# Patient Record
Sex: FEMALE | Race: WHITE | NOT HISPANIC OR LATINO | Employment: UNEMPLOYED | URBAN - NONMETROPOLITAN AREA
[De-identification: names, ages, dates, MRNs, and addresses within clinical notes are randomized per-mention and may not be internally consistent; named-entity substitution may affect disease eponyms.]

---

## 2021-02-08 LAB
BACTERIA SPEC AEROBE CULT: NO GROWTH
EXTERNAL ABO GROUPING: NORMAL
EXTERNAL ANTIBODY SCREEN: NORMAL
EXTERNAL HEMATOCRIT: 36 %
EXTERNAL HEMOGLOBIN: 11.7 G/DL
EXTERNAL PLATELET COUNT: 357 K/ΜL
EXTERNAL RH FACTOR: POSITIVE

## 2021-02-22 LAB — EXTERNAL NIPT: NORMAL

## 2021-05-18 LAB
EXTERNAL HEMATOCRIT: 32 %
EXTERNAL HEMOGLOBIN: 10.8 G/DL
EXTERNAL HEPATITIS B SURFACE ANTIGEN: NEGATIVE
EXTERNAL THYROID STIMULATING HORMONE: 1.65 M[IU]/ML
GLUCOSE 1H P 100 G GLC PO SERPL-MCNC: 199 MG/DL (ref 74–180)
RUBV IGG SERPL IA-ACNC: POSITIVE

## 2021-07-09 ENCOUNTER — INITIAL PRENATAL (OUTPATIENT)
Dept: OBSTETRICS AND GYNECOLOGY | Facility: CLINIC | Age: 21
End: 2021-07-09

## 2021-07-09 VITALS
SYSTOLIC BLOOD PRESSURE: 124 MMHG | WEIGHT: 210 LBS | HEIGHT: 63 IN | BODY MASS INDEX: 37.21 KG/M2 | DIASTOLIC BLOOD PRESSURE: 82 MMHG

## 2021-07-09 DIAGNOSIS — O09.43 SUPERVISION OF HIGH-RISK PREGNANCY WITH GRAND MULTIPARITY IN THIRD TRIMESTER: ICD-10-CM

## 2021-07-09 DIAGNOSIS — O24.414 INSULIN CONTROLLED GESTATIONAL DIABETES MELLITUS (GDM) IN THIRD TRIMESTER: Primary | ICD-10-CM

## 2021-07-09 PROCEDURE — 0501F PRENATAL FLOW SHEET: CPT | Performed by: OBSTETRICS & GYNECOLOGY

## 2021-07-09 RX ORDER — INSULIN GLARGINE 100 [IU]/ML
INJECTION, SOLUTION SUBCUTANEOUS
Status: ON HOLD | COMMUNITY
Start: 2021-07-01 | End: 2021-08-04

## 2021-07-09 RX ORDER — INSULIN LISPRO 100 [IU]/ML
INJECTION, SOLUTION INTRAVENOUS; SUBCUTANEOUS
Status: ON HOLD | COMMUNITY
Start: 2021-07-01 | End: 2021-08-04

## 2021-07-09 RX ORDER — PRENATAL WITH FERROUS FUM AND FOLIC ACID 3080; 920; 120; 400; 22; 1.84; 3; 20; 10; 1; 12; 200; 27; 25; 2 [IU]/1; [IU]/1; MG/1; [IU]/1; MG/1; MG/1; MG/1; MG/1; MG/1; MG/1; UG/1; MG/1; MG/1; MG/1; MG/1
TABLET ORAL
COMMUNITY
Start: 2021-06-15 | End: 2021-09-16 | Stop reason: HOSPADM

## 2021-07-09 RX ORDER — POLYETHYLENE GLYCOL 3350 17 G/17G
POWDER, FOR SOLUTION ORAL
COMMUNITY
Start: 2021-05-20 | End: 2021-09-16 | Stop reason: HOSPADM

## 2021-07-09 RX ORDER — HYDROXYZINE HYDROCHLORIDE 10 MG/1
TABLET, FILM COATED ORAL
COMMUNITY
Start: 2021-06-15 | End: 2021-09-16 | Stop reason: HOSPADM

## 2021-07-09 RX ORDER — BUSPIRONE HYDROCHLORIDE 10 MG/1
TABLET ORAL
COMMUNITY
Start: 2021-06-15 | End: 2021-09-16 | Stop reason: HOSPADM

## 2021-07-09 RX ORDER — FERROUS SULFATE 325(65) MG
TABLET ORAL
COMMUNITY
Start: 2021-05-20 | End: 2021-09-16 | Stop reason: HOSPADM

## 2021-07-11 PROBLEM — O99.343 DEPRESSION AFFECTING PREGNANCY IN THIRD TRIMESTER, ANTEPARTUM: Status: ACTIVE | Noted: 2021-07-11

## 2021-07-11 PROBLEM — F32.A DEPRESSION AFFECTING PREGNANCY IN THIRD TRIMESTER, ANTEPARTUM: Status: ACTIVE | Noted: 2021-07-11

## 2021-07-11 PROBLEM — O09.43 SUPERVISION OF HIGH-RISK PREGNANCY WITH GRAND MULTIPARITY IN THIRD TRIMESTER: Status: ACTIVE | Noted: 2021-07-11

## 2021-07-11 PROBLEM — O24.414 INSULIN CONTROLLED GESTATIONAL DIABETES MELLITUS (GDM) IN THIRD TRIMESTER: Status: ACTIVE | Noted: 2021-07-11

## 2021-07-11 NOTE — PROGRESS NOTES
"CC: Prenatal visit    Robyn Camara is a 21 y.o.  at 31w4d.  Doing well.  Denies contractions, LOF, or VB.  Reports good FM.  Patient presents to establish prenatal care.  Had been being seen at [place].  Is overall stable today.  Patient was recently diagnosed with gestational diabetes and started on insulin this is managed by  group.  Patient states blood sugar control seems to be getting better since starting on insulin, is emailing with nurse practitioner for blood sugar control.  Past medical history denies  Past surgical history denies  Allergies: No known drug allergies  Medications: Lantus, BuSpar, hydroxyzine, MiraLAX, prenatal vitamins  OB history   Social history denies tobacco alcohol or drugs    /82   Ht 160 cm (63\")   Wt 95.3 kg (210 lb)   LMP 2020 (Exact Date)   BMI 37.20 kg/m²     Fundal Height (cm): 31 cm  Fetal Heart Rate: 140     Problems (from 21 to present)     Problem Noted Resolved    Depression affecting pregnancy in third trimester, antepartum 2021 by Maximiliano Calderon DO No          A/P: Robyn Camara is a 21 y.o.  at 31w4d.  Supervision of high-risk pregnancy secondary to gestational diabetes with insulin control.  This is being managed by MFM group.  Consult to MFM group placed for ultrasound and diabetes management.  Patient to return to see me in 3 weeks as she has an appointment next week with MFM.  Fetal kick count  labor precautions given.  Patient to continue on Lantus at this time.  - RTC in 3 weeks  - Reviewed COVID-19 visitation policy  - Reviewed COVID-19 precautions     Diagnosis Plan   1. Insulin controlled gestational diabetes mellitus (GDM) in third trimester  US Ob Follow Up Transabdominal Approach   2. Supervision of high-risk pregnancy with grand multiparity in third trimester       Maximiliano Calderon DO  2021  05:35 CDT    "

## 2021-07-15 DIAGNOSIS — O24.414 INSULIN CONTROLLED GESTATIONAL DIABETES MELLITUS (GDM) IN THIRD TRIMESTER: ICD-10-CM

## 2021-07-16 ENCOUNTER — TELEPHONE (OUTPATIENT)
Dept: OBSTETRICS AND GYNECOLOGY | Facility: CLINIC | Age: 21
End: 2021-07-16

## 2021-07-16 NOTE — TELEPHONE ENCOUNTER
----- Message from Chapis Hanks MD sent at 7/15/2021  6:23 PM CDT -----  [facility] patient.  Please let her know that the US from yesterday is normal.

## 2021-07-21 ENCOUNTER — ROUTINE PRENATAL (OUTPATIENT)
Dept: OBSTETRICS AND GYNECOLOGY | Facility: CLINIC | Age: 21
End: 2021-07-21

## 2021-07-21 VITALS — DIASTOLIC BLOOD PRESSURE: 78 MMHG | BODY MASS INDEX: 37.91 KG/M2 | WEIGHT: 214 LBS | SYSTOLIC BLOOD PRESSURE: 120 MMHG

## 2021-07-21 DIAGNOSIS — Z3A.33 33 WEEKS GESTATION OF PREGNANCY: ICD-10-CM

## 2021-07-21 DIAGNOSIS — O99.343 DEPRESSION AFFECTING PREGNANCY IN THIRD TRIMESTER, ANTEPARTUM: ICD-10-CM

## 2021-07-21 DIAGNOSIS — R12 HEARTBURN DURING PREGNANCY IN THIRD TRIMESTER: ICD-10-CM

## 2021-07-21 DIAGNOSIS — O26.893 HEARTBURN DURING PREGNANCY IN THIRD TRIMESTER: ICD-10-CM

## 2021-07-21 DIAGNOSIS — F32.A DEPRESSION AFFECTING PREGNANCY IN THIRD TRIMESTER, ANTEPARTUM: ICD-10-CM

## 2021-07-21 DIAGNOSIS — O09.93 HIGH-RISK PREGNANCY IN THIRD TRIMESTER: ICD-10-CM

## 2021-07-21 DIAGNOSIS — O24.414 INSULIN CONTROLLED GESTATIONAL DIABETES MELLITUS (GDM) IN THIRD TRIMESTER: Primary | ICD-10-CM

## 2021-07-21 PROCEDURE — 0502F SUBSEQUENT PRENATAL CARE: CPT | Performed by: NURSE PRACTITIONER

## 2021-07-21 PROCEDURE — 59025 FETAL NON-STRESS TEST: CPT | Performed by: NURSE PRACTITIONER

## 2021-07-21 RX ORDER — OMEPRAZOLE 40 MG/1
40 CAPSULE, DELAYED RELEASE ORAL DAILY
Qty: 90 CAPSULE | Refills: 2 | Status: SHIPPED | OUTPATIENT
Start: 2021-07-21 | End: 2021-08-20

## 2021-07-21 NOTE — PROGRESS NOTES
CC: Prenatal visit; MFKRISTEN wants her to have an NST today due to episodic fetal tachycardia noted during her BPP earlier today    Robyn Camara is a 21 y.o.  at 33w0d.  Doing well.  Denies abnormal vaginal d/c, dysuria, headache, contractions, LOF, or VB. Significant heartburn, taking TUMS with every meal.  Reports good FM.    NST reactive, 140s with normal accelerations and no decelerations    /78   Wt 97.1 kg (214 lb)   LMP 2020 (Exact Date)   BMI 37.91 kg/m²   SVE: deferred     Fetal Heart Rate: 140     Problems (from 21 to present)     Problem Noted Resolved    Heartburn during pregnancy in third trimester 2021 by Zenaida Kennedy APRN No    Overview Signed 2021  2:41 PM by Zenaida Kennedy APRN     - start omeprazole         High-risk pregnancy in third trimester 2021 by Maximiliano Calderon DO No    Insulin controlled gestational diabetes mellitus (GDM) in third trimester 2021 by Maximiliano Calderon DO No    Overview Addendum 2021  2:38 PM by Zenaida Kennedy APRN     [facility] TATYANA Davila managing insulin at this time  Pt is having BPP weekly on  with GDM visit following scan. I do not see any recommendations for twice weekly testing at this time from them () [facility]    - Lantus 15u AM, 20u HS & Humalog Breakfast 20u, Lunch 30u and Dinner 30u         Previous Version    Depression affecting pregnancy in third trimester, antepartum 2021 by Maximiliano Calderon DO No          A/P: Robyn Camara is a 21 y.o.  at 33w0d.  - RTC in 1 weeks  - Reviewed COVID-19 visitation policy  - Reviewed COVID-19 precautions     Diagnosis Plan   1. Insulin controlled gestational diabetes mellitus (GDM) in third trimester  Continue with Ms. Camara weekly for glucose control.         2. Depression affecting pregnancy in third trimester, antepartum  Doing well at this time   3. 33 weeks gestation of pregnancy     4. High-risk pregnancy in third  trimester     5. Heartburn during pregnancy in third trimester  Start omeprazole RBA and s/e of new medication.        Zenaida Kennedy, APRN  7/21/2021  14:41 CDT

## 2021-07-26 DIAGNOSIS — O24.414 INSULIN CONTROLLED GESTATIONAL DIABETES MELLITUS (GDM) IN THIRD TRIMESTER: Primary | ICD-10-CM

## 2021-07-27 DIAGNOSIS — O24.414 INSULIN CONTROLLED GESTATIONAL DIABETES MELLITUS (GDM) IN THIRD TRIMESTER: ICD-10-CM

## 2021-07-27 DIAGNOSIS — O09.93 HIGH-RISK PREGNANCY IN THIRD TRIMESTER: ICD-10-CM

## 2021-07-27 DIAGNOSIS — O24.414 INSULIN CONTROLLED GESTATIONAL DIABETES MELLITUS (GDM) IN THIRD TRIMESTER: Primary | ICD-10-CM

## 2021-07-29 ENCOUNTER — ROUTINE PRENATAL (OUTPATIENT)
Dept: OBSTETRICS AND GYNECOLOGY | Facility: CLINIC | Age: 21
End: 2021-07-29

## 2021-07-29 ENCOUNTER — LAB (OUTPATIENT)
Dept: LAB | Facility: HOSPITAL | Age: 21
End: 2021-07-29

## 2021-07-29 VITALS — DIASTOLIC BLOOD PRESSURE: 108 MMHG | BODY MASS INDEX: 39.68 KG/M2 | SYSTOLIC BLOOD PRESSURE: 156 MMHG | WEIGHT: 224 LBS

## 2021-07-29 DIAGNOSIS — O24.414 INSULIN CONTROLLED GESTATIONAL DIABETES MELLITUS (GDM) IN THIRD TRIMESTER: ICD-10-CM

## 2021-07-29 DIAGNOSIS — Z3A.34 34 WEEKS GESTATION OF PREGNANCY: ICD-10-CM

## 2021-07-29 DIAGNOSIS — R03.0 ELEVATED BLOOD PRESSURE READING: ICD-10-CM

## 2021-07-29 DIAGNOSIS — O09.93 HIGH-RISK PREGNANCY IN THIRD TRIMESTER: ICD-10-CM

## 2021-07-29 DIAGNOSIS — O13.3 GESTATIONAL HYPERTENSION, THIRD TRIMESTER: ICD-10-CM

## 2021-07-29 DIAGNOSIS — O40.3XX0 POLYHYDRAMNIOS IN THIRD TRIMESTER COMPLICATION, SINGLE OR UNSPECIFIED FETUS: ICD-10-CM

## 2021-07-29 DIAGNOSIS — O09.93 HIGH-RISK PREGNANCY IN THIRD TRIMESTER: Primary | ICD-10-CM

## 2021-07-29 LAB
ALBUMIN SERPL-MCNC: 2.8 G/DL (ref 3.5–5.2)
ALBUMIN/GLOB SERPL: 0.9 G/DL
ALP SERPL-CCNC: 107 U/L (ref 39–117)
ALT SERPL W P-5'-P-CCNC: 18 U/L (ref 1–33)
ANION GAP SERPL CALCULATED.3IONS-SCNC: 12 MMOL/L (ref 5–15)
AST SERPL-CCNC: 19 U/L (ref 1–32)
BILIRUB SERPL-MCNC: <0.2 MG/DL (ref 0–1.2)
BUN SERPL-MCNC: 7 MG/DL (ref 6–20)
BUN/CREAT SERPL: 18.9 (ref 7–25)
CALCIUM SPEC-SCNC: 8.8 MG/DL (ref 8.6–10.5)
CHLORIDE SERPL-SCNC: 106 MMOL/L (ref 98–107)
CO2 SERPL-SCNC: 20 MMOL/L (ref 22–29)
CREAT SERPL-MCNC: 0.37 MG/DL (ref 0.57–1)
DEPRECATED RDW RBC AUTO: 42.8 FL (ref 37–54)
ERYTHROCYTE [DISTWIDTH] IN BLOOD BY AUTOMATED COUNT: 14.1 % (ref 12.3–15.4)
GFR SERPL CREATININE-BSD FRML MDRD: >150 ML/MIN/1.73
GLOBULIN UR ELPH-MCNC: 3 GM/DL
GLUCOSE SERPL-MCNC: 140 MG/DL (ref 65–99)
HCT VFR BLD AUTO: 33.8 % (ref 34–46.6)
HGB BLD-MCNC: 11 G/DL (ref 12–15.9)
MCH RBC QN AUTO: 28.2 PG (ref 26.6–33)
MCHC RBC AUTO-ENTMCNC: 32.5 G/DL (ref 31.5–35.7)
MCV RBC AUTO: 86.7 FL (ref 79–97)
PLATELET # BLD AUTO: 262 10*3/MM3 (ref 140–450)
PMV BLD AUTO: 13.4 FL (ref 6–12)
POTASSIUM SERPL-SCNC: 4.1 MMOL/L (ref 3.5–5.2)
PROT SERPL-MCNC: 5.8 G/DL (ref 6–8.5)
RBC # BLD AUTO: 3.9 10*6/MM3 (ref 3.77–5.28)
SODIUM SERPL-SCNC: 138 MMOL/L (ref 136–145)
WBC # BLD AUTO: 15.03 10*3/MM3 (ref 3.4–10.8)

## 2021-07-29 PROCEDURE — 0502F SUBSEQUENT PRENATAL CARE: CPT | Performed by: NURSE PRACTITIONER

## 2021-07-29 PROCEDURE — 80053 COMPREHEN METABOLIC PANEL: CPT | Performed by: NURSE PRACTITIONER

## 2021-07-29 PROCEDURE — 85027 COMPLETE CBC AUTOMATED: CPT | Performed by: NURSE PRACTITIONER

## 2021-07-29 NOTE — PROGRESS NOTES
CC: Prenatal visit    Robyn Camara is a 21 y.o.  at 34w1d.  Doing well.  Denies contractions, LOF, or VB.  Reports good FM. B/P is elevated today; GHTN diagnosed as pt has previously had a B/P of 142/80 at her first visit with us. Per pt, she also had some elevated B/Ps while she had care at [facility]. Denies headaches, vision changes, or RUQ. Has some swelling to her hands and lower extremities. Pt is GDM on insulin and this is managed by the [facility]. Pt reports that she does not always check her sugars but is taking her insulin.     BP (!) 156/108   Wt 102 kg (224 lb)   LMP 2020 (Exact Date)   BMI 39.68 kg/m²   SVE: Deferred  Fundal Height (cm): 34 cm  Fetal Heart Rate: +HR     US with [facility] done yesterday: BPP  CHAYO 25.24-mild polyhydramnios. Reviewed this with patient.     Problems (from 21 to present)     Problem Noted Resolved    Heartburn during pregnancy in third trimester 2021 by Zenaida Kennedy APRN No    Overview Signed 2021  2:41 PM by Zenaida Kennedy APRN     - start omeprazole         High-risk pregnancy in third trimester 2021 by Maximiliano Calderon DO No    Insulin controlled gestational diabetes mellitus (GDM) in third trimester 2021 by Maximiliano Calderon DO No    Overview Addendum 2021  2:38 PM by Zenaida Kennedy APRN     [facility] TATYANA Davila managing insulin at this time  Pt is having BPP weekly on  with GDM visit following scan. I do not see any recommendations for twice weekly testing at this time from them () [facility]    - Lantus 15u AM, 20u HS & Humalog Breakfast 20u, Lunch 30u and Dinner 30u         Previous Version    Depression affecting pregnancy in third trimester, antepartum 2021 by Maximiliano Calderon DO No          A/P: Robyn Camara is a 21 y.o.  at 34w1d.  - Encourage pt to check sugars are directed in order for sugars to be optimally manage. Diabetes managed by [facility]; pt states her insulin was recently  increased.  - Consulted with Dr. Hanks Established GHTN dx today; pt to complete Pre-E labs. Pre-E precautions. Plan to return Monday for NST in [facility]; keep appt on August 5th for BPP and OB apt with Dr. Calderon.  - PTL and fetal kick count precautions  - Reviewed COVID-19 visitation policy  - Reviewed COVID-19 precautions     Diagnosis Plan   1. High-risk pregnancy in third trimester     2. Elevated blood pressure reading  CBC (No Diff)    Comprehensive Metabolic Panel    Protein / Creatinine Ratio, Urine - Urine, Clean Catch    Protein, Urine, 24 Hour - Urine, Clean Catch   3. Gestational hypertension, third trimester     4. Insulin controlled gestational diabetes mellitus (GDM) in third trimester     5. 34 weeks gestation of pregnancy       Jennifer Vivar, APRN  7/29/2021  15:55 CDT

## 2021-08-02 ENCOUNTER — ROUTINE PRENATAL (OUTPATIENT)
Dept: OBSTETRICS AND GYNECOLOGY | Facility: CLINIC | Age: 21
End: 2021-08-02

## 2021-08-02 ENCOUNTER — LAB (OUTPATIENT)
Dept: LAB | Facility: HOSPITAL | Age: 21
End: 2021-08-02

## 2021-08-02 VITALS — WEIGHT: 225 LBS | DIASTOLIC BLOOD PRESSURE: 100 MMHG | BODY MASS INDEX: 39.86 KG/M2 | SYSTOLIC BLOOD PRESSURE: 160 MMHG

## 2021-08-02 DIAGNOSIS — O40.3XX0 POLYHYDRAMNIOS IN THIRD TRIMESTER COMPLICATION, SINGLE OR UNSPECIFIED FETUS: ICD-10-CM

## 2021-08-02 DIAGNOSIS — R03.0 ELEVATED BLOOD PRESSURE READING: ICD-10-CM

## 2021-08-02 DIAGNOSIS — O26.893 HEARTBURN DURING PREGNANCY IN THIRD TRIMESTER: ICD-10-CM

## 2021-08-02 DIAGNOSIS — O09.93 HIGH-RISK PREGNANCY IN THIRD TRIMESTER: ICD-10-CM

## 2021-08-02 DIAGNOSIS — R12 HEARTBURN DURING PREGNANCY IN THIRD TRIMESTER: ICD-10-CM

## 2021-08-02 DIAGNOSIS — O99.343 DEPRESSION AFFECTING PREGNANCY IN THIRD TRIMESTER, ANTEPARTUM: ICD-10-CM

## 2021-08-02 DIAGNOSIS — F32.A DEPRESSION AFFECTING PREGNANCY IN THIRD TRIMESTER, ANTEPARTUM: ICD-10-CM

## 2021-08-02 DIAGNOSIS — Z3A.34 34 WEEKS GESTATION OF PREGNANCY: ICD-10-CM

## 2021-08-02 DIAGNOSIS — O13.3 GESTATIONAL HYPERTENSION, THIRD TRIMESTER: Primary | ICD-10-CM

## 2021-08-02 DIAGNOSIS — O24.414 INSULIN CONTROLLED GESTATIONAL DIABETES MELLITUS (GDM) IN THIRD TRIMESTER: ICD-10-CM

## 2021-08-02 LAB
CREAT UR-MCNC: 120.7 MG/DL
PROT 24H UR-MRATE: 818 MG/24HOURS (ref 0–150)
PROT UR-MCNC: 411 MG/DL
PROT/CREAT UR: 3405.1 MG/G CREA (ref 0–200)

## 2021-08-02 PROCEDURE — 84156 ASSAY OF PROTEIN URINE: CPT

## 2021-08-02 PROCEDURE — 59025 FETAL NON-STRESS TEST: CPT | Performed by: NURSE PRACTITIONER

## 2021-08-02 PROCEDURE — 82570 ASSAY OF URINE CREATININE: CPT

## 2021-08-02 PROCEDURE — 81050 URINALYSIS VOLUME MEASURE: CPT

## 2021-08-02 PROCEDURE — 0502F SUBSEQUENT PRENATAL CARE: CPT | Performed by: NURSE PRACTITIONER

## 2021-08-02 RX ORDER — NIFEDIPINE 30 MG/1
30 TABLET, EXTENDED RELEASE ORAL DAILY
Qty: 30 TABLET | Refills: 1 | Status: SHIPPED | OUTPATIENT
Start: 2021-08-02 | End: 2021-08-02

## 2021-08-02 RX ORDER — NIFEDIPINE 10 MG/1
10 CAPSULE ORAL DAILY
Qty: 30 CAPSULE | Refills: 0 | Status: ON HOLD
Start: 2021-08-02 | End: 2021-08-04

## 2021-08-02 NOTE — PROGRESS NOTES
CC: Prenatal visit    Robyn Camara is a 21 y.o.  at 34w5d.  Doing well.  Denies dysuria, abnormal vaginal d/c, headaches, constipation, regular contractions, LOF, or VB.  Reports good FM. States that her blood sugars have been within normal ranges since her insulin was changed last week. Has some episodes of just feeling unwell but usually feels better after resting.     /100   Wt 102 kg (225 lb)   LMP 2020 (Exact Date)   BMI 39.86 kg/m²   SVE: NA  NST Reactive.      Fetal Heart Rate: 144     Problems (from 21 to present)     Problem Noted Resolved    Gestational hypertension, third trimester 2021 by Jennifer Ma APRN No    Overview Signed 2021  9:16 AM by Zenaida Kennedy APRN     Start on procardia xl 30          Polyhydramnios in third trimester 2021 by Jennifer Ma APRN No    Overview Signed 2021  4:13 PM by Jennifer Ma APRN     CHAYO 25.54 mild polyhydramnios on scan          Heartburn during pregnancy in third trimester 2021 by Zenaida Kennedy APRN No    Overview Signed 2021  2:41 PM by Zenaida Kennedy APRN     - start omeprazole         High-risk pregnancy in third trimester 2021 by Maximiliano Calderon DO No    Insulin controlled gestational diabetes mellitus (GDM) in third trimester 2021 by Maximiliano Calderon DO No    Overview Addendum 2021  2:38 PM by Zenaida Kennedy APRN     [facility] TATYANA Davila managing insulin at this time  Pt is having BPP weekly on  with GDM visit following scan. I do not see any recommendations for twice weekly testing at this time from them () [facility]    - Lantus 15u AM, 20u HS & Humalog Breakfast 20u, Lunch 30u and Dinner 30u         Previous Version    Depression affecting pregnancy in third trimester, antepartum 2021 by Maximiliano Calderon DO No          A/P: Robyn Camara is a 21 y.o.  at 34w5d.  - RTC in 3 days following BPP with [facility]  -  Reviewed COVID-19 visitation policy  - Reviewed COVID-19 precautions     Diagnosis Plan   1. Gestational hypertension, third trimester  Fetal Nonstress Test    BP within severe range and symptomatic. Start on Nifedipine 10mg daily now, recheck BP on Thursday. Her pharmacy did not have NIfedipine XL 30mg so I just had them switch it to 10mg. Will change prn.     PreE precautions reviewed.    2. Polyhydramnios in third trimester complication, single or unspecified fetus     3. Heartburn during pregnancy in third trimester  Has not picked up omeprazole yet and is having severe heartburn throughout the day. Advised her to start this ASAP.   4. Depression affecting pregnancy in third trimester, antepartum     5. High-risk pregnancy in third trimester     6. Insulin controlled gestational diabetes mellitus (GDM) in third trimester  Fetal Nonstress Test   7. 34 weeks gestation of pregnancy       Zenaida Kennedy, APRN  8/2/2021  10:38 CDT

## 2021-08-03 ENCOUNTER — HOSPITAL ENCOUNTER (INPATIENT)
Facility: HOSPITAL | Age: 21
LOS: 4 days | Discharge: HOME | End: 2021-08-08
Attending: OBSTETRICS & GYNECOLOGY | Admitting: OBSTETRICS & GYNECOLOGY

## 2021-08-03 DIAGNOSIS — Z98.891 STATUS POST PRIMARY LOW TRANSVERSE CESAREAN SECTION: ICD-10-CM

## 2021-08-03 DIAGNOSIS — O14.13 SEVERE PRE-ECLAMPSIA IN THIRD TRIMESTER: Primary | ICD-10-CM

## 2021-08-03 PROBLEM — O14.10 SEVERE PREECLAMPSIA: Status: ACTIVE | Noted: 2021-08-03

## 2021-08-03 LAB
ABO GROUP BLD: NORMAL
ABO GROUP BLD: NORMAL
ALBUMIN SERPL-MCNC: 2.7 G/DL (ref 3.5–5.2)
ALBUMIN/GLOB SERPL: 1 G/DL
ALP SERPL-CCNC: 117 U/L (ref 39–117)
ALT SERPL W P-5'-P-CCNC: 11 U/L (ref 1–33)
AMPHET+METHAMPHET UR QL: NEGATIVE
AMPHETAMINES UR QL: NEGATIVE
ANION GAP SERPL CALCULATED.3IONS-SCNC: 9 MMOL/L (ref 5–15)
AST SERPL-CCNC: 18 U/L (ref 1–32)
BACTERIA UR QL AUTO: ABNORMAL /HPF
BARBITURATES UR QL SCN: NEGATIVE
BASOPHILS # BLD AUTO: 0.04 10*3/MM3 (ref 0–0.2)
BASOPHILS NFR BLD AUTO: 0.3 % (ref 0–1.5)
BENZODIAZ UR QL SCN: NEGATIVE
BILIRUB SERPL-MCNC: <0.2 MG/DL (ref 0–1.2)
BILIRUB UR QL STRIP: NEGATIVE
BLD GP AB SCN SERPL QL: NEGATIVE
BUN SERPL-MCNC: 6 MG/DL (ref 6–20)
BUN/CREAT SERPL: 12.8 (ref 7–25)
BUPRENORPHINE SERPL-MCNC: NEGATIVE NG/ML
CALCIUM SPEC-SCNC: 8.2 MG/DL (ref 8.6–10.5)
CANNABINOIDS SERPL QL: NEGATIVE
CHLORIDE SERPL-SCNC: 103 MMOL/L (ref 98–107)
CLARITY UR: ABNORMAL
CO2 SERPL-SCNC: 20 MMOL/L (ref 22–29)
COCAINE UR QL: NEGATIVE
COLOR UR: YELLOW
CREAT SERPL-MCNC: 0.47 MG/DL (ref 0.57–1)
DEPRECATED RDW RBC AUTO: 47.3 FL (ref 37–54)
EOSINOPHIL # BLD AUTO: 0.09 10*3/MM3 (ref 0–0.4)
EOSINOPHIL NFR BLD AUTO: 0.6 % (ref 0.3–6.2)
ERYTHROCYTE [DISTWIDTH] IN BLOOD BY AUTOMATED COUNT: 15.3 % (ref 12.3–15.4)
GFR SERPL CREATININE-BSD FRML MDRD: >150 ML/MIN/1.73
GLOBULIN UR ELPH-MCNC: 2.8 GM/DL
GLUCOSE BLDC GLUCOMTR-MCNC: 104 MG/DL (ref 70–130)
GLUCOSE BLDC GLUCOMTR-MCNC: 112 MG/DL (ref 70–130)
GLUCOSE BLDC GLUCOMTR-MCNC: 134 MG/DL (ref 70–130)
GLUCOSE BLDC GLUCOMTR-MCNC: 175 MG/DL (ref 70–130)
GLUCOSE SERPL-MCNC: 137 MG/DL (ref 65–99)
GLUCOSE UR STRIP-MCNC: NEGATIVE MG/DL
HCT VFR BLD AUTO: 32.4 % (ref 34–46.6)
HGB BLD-MCNC: 10.9 G/DL (ref 12–15.9)
HGB UR QL STRIP.AUTO: NEGATIVE
HYALINE CASTS UR QL AUTO: ABNORMAL /LPF
IMM GRANULOCYTES # BLD AUTO: 0.09 10*3/MM3 (ref 0–0.05)
IMM GRANULOCYTES NFR BLD AUTO: 0.6 % (ref 0–0.5)
KETONES UR QL STRIP: ABNORMAL
LEUKOCYTE ESTERASE UR QL STRIP.AUTO: NEGATIVE
LYMPHOCYTES # BLD AUTO: 2.06 10*3/MM3 (ref 0.7–3.1)
LYMPHOCYTES NFR BLD AUTO: 13.3 % (ref 19.6–45.3)
Lab: NORMAL
MCH RBC QN AUTO: 29.1 PG (ref 26.6–33)
MCHC RBC AUTO-ENTMCNC: 33.6 G/DL (ref 31.5–35.7)
MCV RBC AUTO: 86.4 FL (ref 79–97)
METHADONE UR QL SCN: NEGATIVE
MONOCYTES # BLD AUTO: 1.05 10*3/MM3 (ref 0.1–0.9)
MONOCYTES NFR BLD AUTO: 6.8 % (ref 5–12)
MUCOUS THREADS URNS QL MICRO: ABNORMAL /HPF
NEUTROPHILS NFR BLD AUTO: 12.2 10*3/MM3 (ref 1.7–7)
NEUTROPHILS NFR BLD AUTO: 78.4 % (ref 42.7–76)
NITRITE UR QL STRIP: NEGATIVE
NRBC BLD AUTO-RTO: 0 /100 WBC (ref 0–0.2)
OPIATES UR QL: NEGATIVE
OXYCODONE UR QL SCN: NEGATIVE
PCP UR QL SCN: NEGATIVE
PH UR STRIP.AUTO: 6 [PH] (ref 5–9)
PLATELET # BLD AUTO: 264 10*3/MM3 (ref 140–450)
PMV BLD AUTO: 11.9 FL (ref 6–12)
POTASSIUM SERPL-SCNC: 3.6 MMOL/L (ref 3.5–5.2)
PROPOXYPH UR QL: NEGATIVE
PROT SERPL-MCNC: 5.5 G/DL (ref 6–8.5)
PROT UR QL STRIP: ABNORMAL
RBC # BLD AUTO: 3.75 10*6/MM3 (ref 3.77–5.28)
RBC # UR: ABNORMAL /HPF
REF LAB TEST METHOD: ABNORMAL
RH BLD: POSITIVE
RH BLD: POSITIVE
SODIUM SERPL-SCNC: 132 MMOL/L (ref 136–145)
SP GR UR STRIP: 1.03 (ref 1–1.03)
SQUAMOUS #/AREA URNS HPF: ABNORMAL /HPF
T&S EXPIRATION DATE: NORMAL
TRICYCLICS UR QL SCN: NEGATIVE
UROBILINOGEN UR QL STRIP: ABNORMAL
WBC # BLD AUTO: 15.53 10*3/MM3 (ref 3.4–10.8)
WBC UR QL AUTO: ABNORMAL /HPF
WHOLE BLOOD HOLD SPECIMEN: NORMAL

## 2021-08-03 PROCEDURE — 25010000002 HYDRALAZINE PER 20 MG

## 2021-08-03 PROCEDURE — 25010000002 BUTORPHANOL PER 1 MG: Performed by: OBSTETRICS & GYNECOLOGY

## 2021-08-03 PROCEDURE — 81001 URINALYSIS AUTO W/SCOPE: CPT | Performed by: OBSTETRICS & GYNECOLOGY

## 2021-08-03 PROCEDURE — 25010000002 HYDRALAZINE PER 20 MG: Performed by: OBSTETRICS & GYNECOLOGY

## 2021-08-03 PROCEDURE — 80306 DRUG TEST PRSMV INSTRMNT: CPT | Performed by: OBSTETRICS & GYNECOLOGY

## 2021-08-03 PROCEDURE — 25010000002 ONDANSETRON PER 1 MG: Performed by: OBSTETRICS & GYNECOLOGY

## 2021-08-03 PROCEDURE — 86900 BLOOD TYPING SEROLOGIC ABO: CPT | Performed by: OBSTETRICS & GYNECOLOGY

## 2021-08-03 PROCEDURE — 36415 COLL VENOUS BLD VENIPUNCTURE: CPT | Performed by: OBSTETRICS & GYNECOLOGY

## 2021-08-03 PROCEDURE — 85025 COMPLETE CBC W/AUTO DIFF WBC: CPT | Performed by: OBSTETRICS & GYNECOLOGY

## 2021-08-03 PROCEDURE — 86901 BLOOD TYPING SEROLOGIC RH(D): CPT | Performed by: OBSTETRICS & GYNECOLOGY

## 2021-08-03 PROCEDURE — 82962 GLUCOSE BLOOD TEST: CPT

## 2021-08-03 PROCEDURE — 59200 INSERT CERVICAL DILATOR: CPT | Performed by: OBSTETRICS & GYNECOLOGY

## 2021-08-03 PROCEDURE — 3E0P7VZ INTRODUCTION OF HORMONE INTO FEMALE REPRODUCTIVE, VIA NATURAL OR ARTIFICIAL OPENING: ICD-10-PCS | Performed by: OBSTETRICS & GYNECOLOGY

## 2021-08-03 PROCEDURE — 86901 BLOOD TYPING SEROLOGIC RH(D): CPT

## 2021-08-03 PROCEDURE — 80053 COMPREHEN METABOLIC PANEL: CPT | Performed by: OBSTETRICS & GYNECOLOGY

## 2021-08-03 PROCEDURE — 63710000001 PROMETHAZINE PER 25 MG: Performed by: OBSTETRICS & GYNECOLOGY

## 2021-08-03 PROCEDURE — 25010000003 MAGNESIUM SULFATE 20 GM/500ML SOLUTION: Performed by: OBSTETRICS & GYNECOLOGY

## 2021-08-03 PROCEDURE — 25010000003 PENICILLIN G POTASSIUM PER 600000 UNITS: Performed by: OBSTETRICS & GYNECOLOGY

## 2021-08-03 PROCEDURE — 86900 BLOOD TYPING SEROLOGIC ABO: CPT

## 2021-08-03 PROCEDURE — 86850 RBC ANTIBODY SCREEN: CPT | Performed by: OBSTETRICS & GYNECOLOGY

## 2021-08-03 RX ORDER — MISOPROSTOL 100 MCG
50 TABLET ORAL ONCE
Status: COMPLETED | OUTPATIENT
Start: 2021-08-03 | End: 2021-08-03

## 2021-08-03 RX ORDER — MAGNESIUM SULFATE HEPTAHYDRATE 40 MG/ML
2 INJECTION, SOLUTION INTRAVENOUS CONTINUOUS
Status: DISCONTINUED | OUTPATIENT
Start: 2021-08-03 | End: 2021-08-04 | Stop reason: HOSPADM

## 2021-08-03 RX ORDER — SODIUM CHLORIDE 0.9 % (FLUSH) 0.9 %
10 SYRINGE (ML) INJECTION AS NEEDED
Status: DISCONTINUED | OUTPATIENT
Start: 2021-08-03 | End: 2021-08-04 | Stop reason: HOSPADM

## 2021-08-03 RX ORDER — LABETALOL HYDROCHLORIDE 5 MG/ML
INJECTION, SOLUTION INTRAVENOUS
Status: COMPLETED
Start: 2021-08-03 | End: 2021-08-03

## 2021-08-03 RX ORDER — LABETALOL HYDROCHLORIDE 5 MG/ML
20-80 INJECTION, SOLUTION INTRAVENOUS
Status: DISCONTINUED | OUTPATIENT
Start: 2021-08-03 | End: 2021-08-03

## 2021-08-03 RX ORDER — OXYTOCIN/0.9 % SODIUM CHLORIDE 30/500 ML
2-20 PLASTIC BAG, INJECTION (ML) INTRAVENOUS CONTINUOUS
Status: DISCONTINUED | OUTPATIENT
Start: 2021-08-03 | End: 2021-08-04

## 2021-08-03 RX ORDER — SODIUM CHLORIDE, SODIUM LACTATE, POTASSIUM CHLORIDE, CALCIUM CHLORIDE 600; 310; 30; 20 MG/100ML; MG/100ML; MG/100ML; MG/100ML
125 INJECTION, SOLUTION INTRAVENOUS CONTINUOUS
Status: DISCONTINUED | OUTPATIENT
Start: 2021-08-03 | End: 2021-08-03

## 2021-08-03 RX ORDER — ONDANSETRON 2 MG/ML
4 INJECTION INTRAMUSCULAR; INTRAVENOUS EVERY 6 HOURS PRN
Status: DISCONTINUED | OUTPATIENT
Start: 2021-08-03 | End: 2021-08-04 | Stop reason: SDUPTHER

## 2021-08-03 RX ORDER — DEXTROSE MONOHYDRATE 25 G/50ML
25 INJECTION, SOLUTION INTRAVENOUS
Status: DISCONTINUED | OUTPATIENT
Start: 2021-08-03 | End: 2021-08-04

## 2021-08-03 RX ORDER — NIFEDIPINE 30 MG/1
30 TABLET, EXTENDED RELEASE ORAL ONCE
Status: COMPLETED | OUTPATIENT
Start: 2021-08-03 | End: 2021-08-03

## 2021-08-03 RX ORDER — PROMETHAZINE HYDROCHLORIDE 25 MG/1
25 TABLET ORAL ONCE
Status: COMPLETED | OUTPATIENT
Start: 2021-08-03 | End: 2021-08-03

## 2021-08-03 RX ORDER — SODIUM CHLORIDE, SODIUM LACTATE, POTASSIUM CHLORIDE, CALCIUM CHLORIDE 600; 310; 30; 20 MG/100ML; MG/100ML; MG/100ML; MG/100ML
50 INJECTION, SOLUTION INTRAVENOUS CONTINUOUS
Status: DISCONTINUED | OUTPATIENT
Start: 2021-08-03 | End: 2021-08-04 | Stop reason: HOSPADM

## 2021-08-03 RX ORDER — HYDRALAZINE HYDROCHLORIDE 20 MG/ML
INJECTION INTRAMUSCULAR; INTRAVENOUS
Status: COMPLETED
Start: 2021-08-03 | End: 2021-08-03

## 2021-08-03 RX ORDER — SODIUM CHLORIDE 0.9 % (FLUSH) 0.9 %
10 SYRINGE (ML) INJECTION EVERY 12 HOURS SCHEDULED
Status: DISCONTINUED | OUTPATIENT
Start: 2021-08-03 | End: 2021-08-04 | Stop reason: HOSPADM

## 2021-08-03 RX ORDER — HYDRALAZINE HYDROCHLORIDE 20 MG/ML
5 INJECTION INTRAMUSCULAR; INTRAVENOUS
Status: DISCONTINUED | OUTPATIENT
Start: 2021-08-03 | End: 2021-08-04

## 2021-08-03 RX ORDER — NICOTINE POLACRILEX 4 MG
15 LOZENGE BUCCAL
Status: DISCONTINUED | OUTPATIENT
Start: 2021-08-03 | End: 2021-08-04

## 2021-08-03 RX ORDER — NIFEDIPINE 30 MG/1
30 TABLET, EXTENDED RELEASE ORAL
Status: DISCONTINUED | OUTPATIENT
Start: 2021-08-03 | End: 2021-08-03

## 2021-08-03 RX ORDER — NIFEDIPINE 60 MG/1
60 TABLET, EXTENDED RELEASE ORAL
Status: DISCONTINUED | OUTPATIENT
Start: 2021-08-04 | End: 2021-08-03

## 2021-08-03 RX ADMIN — HYDRALAZINE HYDROCHLORIDE 10 MG: 20 INJECTION INTRAMUSCULAR; INTRAVENOUS at 17:03

## 2021-08-03 RX ADMIN — HYDRALAZINE HYDROCHLORIDE 5 MG: 20 INJECTION INTRAMUSCULAR; INTRAVENOUS at 16:02

## 2021-08-03 RX ADMIN — HYDRALAZINE HYDROCHLORIDE 5 MG: 20 INJECTION INTRAMUSCULAR; INTRAVENOUS at 23:30

## 2021-08-03 RX ADMIN — NIFEDIPINE 30 MG: 30 TABLET, FILM COATED, EXTENDED RELEASE ORAL at 20:40

## 2021-08-03 RX ADMIN — BUTORPHANOL TARTRATE 2 MG: 2 INJECTION, SOLUTION INTRAMUSCULAR; INTRAVENOUS at 18:10

## 2021-08-03 RX ADMIN — LABETALOL HYDROCHLORIDE 20 MG: 5 INJECTION, SOLUTION INTRAVENOUS at 12:36

## 2021-08-03 RX ADMIN — SODIUM CHLORIDE 3 MILLION UNITS: 9 INJECTION, SOLUTION INTRAVENOUS at 23:30

## 2021-08-03 RX ADMIN — ONDANSETRON 4 MG: 2 INJECTION INTRAMUSCULAR; INTRAVENOUS at 20:09

## 2021-08-03 RX ADMIN — SODIUM CHLORIDE 5 MILLION UNITS: 9 INJECTION, SOLUTION INTRAVENOUS at 15:47

## 2021-08-03 RX ADMIN — PROMETHAZINE HYDROCHLORIDE 25 MG: 25 TABLET ORAL at 18:09

## 2021-08-03 RX ADMIN — NIFEDIPINE 30 MG: 30 TABLET, FILM COATED, EXTENDED RELEASE ORAL at 14:10

## 2021-08-03 RX ADMIN — MAGNESIUM SULFATE HEPTAHYDRATE 2 G/HR: 40 INJECTION, SOLUTION INTRAVENOUS at 21:35

## 2021-08-03 RX ADMIN — LABETALOL HYDROCHLORIDE 80 MG: 5 INJECTION, SOLUTION INTRAVENOUS at 13:08

## 2021-08-03 RX ADMIN — LABETALOL HYDROCHLORIDE 80 MG: 5 INJECTION, SOLUTION INTRAVENOUS at 15:35

## 2021-08-03 RX ADMIN — SODIUM CHLORIDE 3 MILLION UNITS: 9 INJECTION, SOLUTION INTRAVENOUS at 20:31

## 2021-08-03 RX ADMIN — SODIUM CHLORIDE, POTASSIUM CHLORIDE, SODIUM LACTATE AND CALCIUM CHLORIDE 50 ML/HR: 600; 310; 30; 20 INJECTION, SOLUTION INTRAVENOUS at 12:45

## 2021-08-03 RX ADMIN — LABETALOL HYDROCHLORIDE 40 MG: 5 INJECTION, SOLUTION INTRAVENOUS at 12:51

## 2021-08-03 RX ADMIN — NIFEDIPINE 30 MG: 30 TABLET, FILM COATED, EXTENDED RELEASE ORAL at 15:30

## 2021-08-03 RX ADMIN — MISOPROSTOL 50 MCG: 100 TABLET ORAL at 14:10

## 2021-08-03 RX ADMIN — OXYTOCIN-SODIUM CHLORIDE 0.9% IV SOLN 30 UNIT/500ML 2 MILLI-UNITS/MIN: 30-0.9/5 SOLUTION at 20:09

## 2021-08-03 NOTE — H&P
[facility]  Obstetric History and Physical    Chief Complaint   Patient presents with   • Elevated Blood Pressure           Patient is a 21 y.o. female  currently at 34w6d, who presents for elevated blood pressures and elevated protein creatinine ratio.  Patient was seen last week with elevated blood pressures and labs were drawn CBC and CMP were normal patient was sent home with a 24-hour urine protein, during this visit blood pressures were mild range.  Patient was seen again yesterday and did have a severe range blood pressure but lab work for the 24-hour urine protein had not returned yet.  24-hour urine protein returned today and was noted to be over 800.  Given the severe range blood pressure and elevated 24-hour urine protein, I recommended patient come in for further evaluation.  Upon arrival to labor and delivery patient had multiple severe range blood pressures requiring IV labetalol.  Patient was started on magnesium therapy and started on oral nifedipine.  Since that time patient has required 5 doses of labetalol and now 2 doses of hydralazine for blood pressure control.  Pregnancy is also complicated by gestational diabetes currently on insulin, management has been done by the  group to this point.    Her prenatal care is complicated by preeclampsia with severe features based on severe range blood pressures and protein in her urine, also with gestational diabetes on significant doses of insulin.  Patient is a transfer of care from [place] once patient was started on insulin as they do not manage that there.     Obstetric History   # 1 - Date: None, Sex: None, Weight: None, GA: None, Delivery: None, Apgar1: None, Apgar5: None, Living: None, Birth Comments: None      The following portions of the patient's history were reviewed and updated as appropriate: current medications, allergies, past medical history, past surgical history, past family history, past social history and  problem list .       Prenatal Information:  Prenatal Results     POC Urine Glucose/Protein     Test Value Reference Range Date Time    Urine Glucose        Urine Protein              Initial Prenatal Labs     Test Value Reference Range Date Time    Hemoglobin ^ 11.7 g/dL  02/08/21     Hematocrit ^ 36 %  02/08/21     Platelets  264 10*3/mm3 140 - 450 08/03/21 1215       262 10*3/mm3 140 - 450 07/29/21 1137      ^ 357 K/µL  02/08/21     Rubella IgG ^ positive   05/18/21     Hepatitis B SAg ^ Negative   05/18/21     Hepatitis C Ab        RPR        ABO  A   08/03/21 1438    Rh  Positive   08/03/21 1438    Antibody Screen ^ Normal  Normal 02/08/21     HIV        Urine Culture ^ No growth  No growth at 24 hours, No growth at 2 days, No growth at 3 days, No growth, Growth present, too young to evaluate, Culture in progress 02/08/21     Gonorrhea        Chlamydia        TSH ^ 1.65 m[IU]/mL  05/18/21           2nd and 3rd Trimester     Test Value Reference Range Date Time    Hemoglobin (repeated)  10.9 g/dL 12.0 - 15.9 08/03/21 1215       11.0 g/dL 12.0 - 15.9 07/29/21 1137      ^ 10.8 g/dL  05/18/21     Hematocrit (repeated)  32.4 % 34.0 - 46.6 08/03/21 1215       33.8 % 34.0 - 46.6 07/29/21 1137      ^ 32 %  05/18/21     GCT ^ 199 mg/dL 74 - 180 05/18/21     Antibody Screen (repeated)  Negative   08/03/21 1220    GTT Fasting        GTT 1 Hr        GTT 2 Hr        GTT 3 Hr        Group B Strep              Drug Screening     Test Value Reference Range Date Time    Amphetamine Screen  Negative  Negative 08/03/21 1250    Barbiturate Screen  Negative  Negative 08/03/21 1250    Benzodiazepine Screen  Negative  Negative 08/03/21 1250    Methadone Screen  Negative  Negative 08/03/21 1250    Phencyclidine Screen  Negative  Negative 08/03/21 1250    Opiates Screen  Negative  Negative 08/03/21 1250    THC Screen  Negative  Negative 08/03/21 1250    Cocaine Screen  Negative  Negative 08/03/21 1250    Propoxyphene Screen  Negative   Negative 08/03/21 1250    Buprenorphine Screen  Negative  Negative 08/03/21 1250    Methamphetamine Screen  Negative  Negative 08/03/21 1250    Oxycodone Screen  Negative  Negative 08/03/21 1250    Tricyclic Antidepressants Screen  Negative  Negative 08/03/21 1250          Other (Risk screening)     Test Value Reference Range Date Time    Varicella IgG        Parvovirus IgG        CMV IgG        Cystic Fibrosis        Hemoglobin electrophoresis        NIPT ^ negative female   02/22/21     MSAFP-4        AFP (for NTD only)              Legend    ^: Historical                      External Prenatal Results     Pregnancy Outside Results - Transcribed From Office Records - See Scanned Records For Details     Test Value Date Time    ABO  A  08/03/21 1438    Rh  Positive  08/03/21 1438    Antibody Screen  Negative  08/03/21 1220      ^ Normal  02/08/21     Varicella IgG       Rubella ^ positive  05/18/21     Hgb  10.9 g/dL 08/03/21 1215       11.0 g/dL 07/29/21 1137      ^ 10.8 g/dL 05/18/21       ^ 11.7 g/dL 02/08/21     Hct  32.4 % 08/03/21 1215       33.8 % 07/29/21 1137      ^ 32 % 05/18/21       ^ 36 % 02/08/21     Glucose Fasting GTT       Glucose Tolerance Test 1 hour       Glucose Tolerance Test 3 hour       Gonorrhea (discrete)       Chlamydia (discrete)       RPR       VDRL       Syphilis Antibody       HBsAg ^ Negative  05/18/21     Herpes Simplex Virus PCR       Herpes Simplex VIrus Culture       HIV       Hep C RNA Quant PCR       Hep C Antibody       AFP       Group B Strep       GBS Susceptibility to Clindamycin       GBS Susceptibility to Erythromycin       Fetal Fibronectin       Genetic Testing, Maternal Blood             Drug Screening     Test Value Date Time    Urine Drug Screen       Amphetamine Screen  Negative  08/03/21 1250    Barbiturate Screen  Negative  08/03/21 1250    Benzodiazepine Screen  Negative  08/03/21 1250    Methadone Screen  Negative  08/03/21 1250    Phencyclidine Screen  Negative   21 1250    Opiates Screen  Negative  21 1250    THC Screen  Negative  21 1250    Cocaine Screen       Propoxyphene Screen  Negative  21 1250    Buprenorphine Screen  Negative  21 1250    Methamphetamine Screen       Oxycodone Screen  Negative  21 1250    Tricyclic Antidepressants Screen  Negative  21 1250          Legend    ^: Historical                         Past OB History:     OB History    Para Term  AB Living   1 0 0 0 0 0   SAB TAB Ectopic Molar Multiple Live Births   0 0 0 0 0 0      # Outcome Date GA Lbr Abdelrahman/2nd Weight Sex Delivery Anes PTL Lv   1 Current                 ALLERGIES:   No Known Allergies     Home Medications:     Prior to Admission medications    Medication Sig Start Date End Date Taking? Authorizing Provider   busPIRone (BUSPAR) 10 MG tablet  6/15/21  Yes Gay Portillo MD   FeroSul 325 (65 Fe) MG tablet  21  Yes Gay Portillo MD   HumaLOG KwikPen 100 UNIT/ML solution pen-injector Pt states states she takes 20 units before breakfast, 30 units before lunch and 30 units before supper 21  Yes Gay Portillo MD   hydrOXYzine (ATARAX) 10 MG tablet  6/15/21  Yes Gay Portillo MD   Lanyu SoloStar 100 UNIT/ML injection pen Pt states 15 units every morning and 20 units every night 21  Yes Gay Portillo MD   NIFEdipine (Procardia) 10 MG capsule Take 1 capsule by mouth Daily. 21  Yes Zenaida Kennedy APRN   Prenatal Vit-Fe Fumarate-FA (Prenatal 27-1) 27-1 MG tablet tablet  6/15/21  Yes Gay Portillo MD   omeprazole (priLOSEC) 40 MG capsule Take 1 capsule by mouth Daily for 30 days. 21  Zenaida Kennedy APRN   polyethylene glycol (MIRALAX) 17 GM/SCOOP powder  21   ProviderGay MD       Past Medical History: No past medical history on file.   Past Surgical History Past Surgical History:   Procedure Laterality Date   • TONSILLECTOMY        Family History: Family  History   Problem Relation Age of Onset   • Hypertension Mother    • No Known Problems Father    • Breast cancer Paternal Aunt    • Breast cancer Maternal Grandmother    • Breast cancer Paternal Grandmother       Social History:  reports that she has never smoked. She has never used smokeless tobacco.   reports no history of alcohol use.   reports no history of drug use.        Review of Systems   Constitutional: Negative for chills, fatigue and fever.   HENT: Negative for sore throat.    Eyes: Negative for visual disturbance.   Respiratory: Negative for cough, shortness of breath and wheezing.    Cardiovascular: Negative for chest pain, palpitations and leg swelling.   Gastrointestinal: Negative for abdominal pain, diarrhea, nausea and vomiting.   Endocrine: Negative for cold intolerance and heat intolerance.   Genitourinary: Negative for dysuria, flank pain, frequency, menstrual problem, pelvic pain, vaginal bleeding, vaginal discharge and vaginal pain.   Skin: Negative for color change and pallor.   Neurological: Negative for syncope, light-headedness and headaches.   Psychiatric/Behavioral: Negative for dysphoric mood and suicidal ideas. The patient is not nervous/anxious.      Objective     Documented Vitals    08/03/21 1531 08/03/21 1554 08/03/21 1625 08/03/21 1702   BP: 177/100 175/90 152/94 171/95   Pulse: 106 105 108 105   Resp:    16   Temp:       SpO2:  99%  99%   Weight:       Height:           OBGyn Exam  Constitutional: Appears to be in no acute distress; Eyes: sclera normal; Endocrine system: thyroid palpate is normal; Pulmonary system: lungs clear; Cardiovascular system: heart regular rate and rhythm; Gastrointestinal system: abdomen soft nontender, active bowel sounds; Urologic system: CVA negative; Psychiatric: appropriate insight; Neurologic: gait within normal limits category 1 fetal heart rate tracing, cervix: 2/75/-2, cephalic by sutures.  Patient with continuous severe range blood pressures  requiring high doses of IV blood pressure medications.      Last Labs  Lab Results   Component Value Date    WBC 15.53 (H) 2021    RBC 3.75 (L) 2021    HGB 10.9 (L) 2021    HCT 32.4 (L) 2021    MCV 86.4 2021    MCH 29.1 2021    MCHC 33.6 2021    RDW 15.3 2021    RDWSD 47.3 2021    MPV 11.9 2021     2021        Lab Results   Component Value Date    GLUCOSE 137 (H) 2021    BUN 6 2021    CREATININE 0.47 (L) 2021     (L) 2021    K 3.6 2021     2021    CO2 20.0 (L) 2021    CALCIUM 8.2 (L) 2021    PROTEINTOT 5.5 (L) 2021    ALBUMIN 2.70 (L) 2021    ALT 11 2021    AST 18 2021    ALKPHOS 117 2021    BILITOT <0.2 2021    EGFRIFNONA >150 2021    GLOB 2.8 2021    AGRATIO 1.0 2021    BCR 12.8 2021    ANIONGAP 9.0 2021       No results found for: HCGQUAL      Assessment/Plan:  1. 21 y.o. [id number].  Patient with preeclampsia with severe features based on elevated 24-hour urine protein and severe range blood pressures.  Patient requiring multiple doses of IV antihypertensives to try to control blood pressures.  Preeclampsia labs of the part from the protein have been normal.  She denies any headache or right upper quadrant pain at this time.  Given the diagnosis of severe preeclampsia induction of labor was started with 50 mcg of Cytotec sublingually.  We will try to place [place] balloon when able.  We will likely continue augmentation with Pitocin.  We will keep a close eye on blood pressures.  IV labetalol and IV hydralazine as needed.  Patient has also been started on 60 mg of Procardia XL.  Patient has been started on magnesium 4 g loading dose followed by 2 g an hour.  Patient with diabetes.  We will put her on a high-dose sliding scale to manage blood sugar control.  Epidural for pain control if patient desires.  Have had long  discussion with patient that if control of blood pressures becomes very difficult or not obtainable patient may need to be delivered via  section in an expedited fashion to treat her current disease.      Robyn Camara and I have discussed pain goals for this hospitalization after reviewing her current clinical condition, medical history and prior pain experiences.  The goal is to keep her pain level tolerable.  To help achieve this, I plan to offer IV pain medication and epidural if patient desires..           This document has been electronically signed by Maximiliano Calderon DO on August 3, 2021 17:19 CDT

## 2021-08-04 ENCOUNTER — ANESTHESIA (OUTPATIENT)
Dept: LABOR AND DELIVERY | Facility: HOSPITAL | Age: 21
End: 2021-08-04

## 2021-08-04 ENCOUNTER — ANESTHESIA EVENT (OUTPATIENT)
Dept: LABOR AND DELIVERY | Facility: HOSPITAL | Age: 21
End: 2021-08-04

## 2021-08-04 PROBLEM — Z98.891 STATUS POST PRIMARY LOW TRANSVERSE CESAREAN SECTION: Status: ACTIVE | Noted: 2021-08-04

## 2021-08-04 LAB
GLUCOSE BLDC GLUCOMTR-MCNC: 103 MG/DL (ref 70–130)
GLUCOSE BLDC GLUCOMTR-MCNC: 87 MG/DL (ref 70–130)

## 2021-08-04 PROCEDURE — 25010000002 ONDANSETRON PER 1 MG: Performed by: OBSTETRICS & GYNECOLOGY

## 2021-08-04 PROCEDURE — 25010000002 HYDRALAZINE PER 20 MG: Performed by: OBSTETRICS & GYNECOLOGY

## 2021-08-04 PROCEDURE — 25010000002 KETOROLAC TROMETHAMINE PER 15 MG: Performed by: NURSE ANESTHETIST, CERTIFIED REGISTERED

## 2021-08-04 PROCEDURE — C1755 CATHETER, INTRASPINAL: HCPCS

## 2021-08-04 PROCEDURE — 25010000002 FENTANYL CITRATE (PF) 50 MCG/ML SOLUTION: Performed by: NURSE ANESTHETIST, CERTIFIED REGISTERED

## 2021-08-04 PROCEDURE — 25010000002 PROPOFOL 10 MG/ML EMULSION: Performed by: NURSE ANESTHETIST, CERTIFIED REGISTERED

## 2021-08-04 PROCEDURE — 25010000002 PHENYLEPHRINE 10 MG/ML SOLUTION: Performed by: NURSE ANESTHETIST, CERTIFIED REGISTERED

## 2021-08-04 PROCEDURE — 88307 TISSUE EXAM BY PATHOLOGIST: CPT

## 2021-08-04 PROCEDURE — 25010000002 HYDROMORPHONE PER 4 MG: Performed by: OBSTETRICS & GYNECOLOGY

## 2021-08-04 PROCEDURE — 51703 INSERT BLADDER CATH COMPLEX: CPT

## 2021-08-04 PROCEDURE — 59510 CESAREAN DELIVERY: CPT | Performed by: OBSTETRICS & GYNECOLOGY

## 2021-08-04 PROCEDURE — 82962 GLUCOSE BLOOD TEST: CPT

## 2021-08-04 PROCEDURE — C1755 CATHETER, INTRASPINAL: HCPCS | Performed by: NURSE ANESTHETIST, CERTIFIED REGISTERED

## 2021-08-04 PROCEDURE — 25010000002 ONDANSETRON PER 1 MG: Performed by: NURSE ANESTHETIST, CERTIFIED REGISTERED

## 2021-08-04 PROCEDURE — 25010000002 KETOROLAC TROMETHAMINE PER 15 MG: Performed by: OBSTETRICS & GYNECOLOGY

## 2021-08-04 PROCEDURE — 25010000002 CEFAZOLIN PER 500 MG: Performed by: OBSTETRICS & GYNECOLOGY

## 2021-08-04 PROCEDURE — 59514 CESAREAN DELIVERY ONLY: CPT | Performed by: SPECIALIST/TECHNOLOGIST, OTHER

## 2021-08-04 PROCEDURE — 25010000003 MAGNESIUM SULFATE 20 GM/500ML SOLUTION: Performed by: OBSTETRICS & GYNECOLOGY

## 2021-08-04 PROCEDURE — 25010000003 PENICILLIN G POTASSIUM PER 600000 UNITS: Performed by: OBSTETRICS & GYNECOLOGY

## 2021-08-04 PROCEDURE — 25010000002 AZITHROMYCIN 500 MG/250 ML: Performed by: OBSTETRICS & GYNECOLOGY

## 2021-08-04 RX ORDER — PHENYLEPHRINE HYDROCHLORIDE 10 MG/ML
INJECTION INTRAVENOUS AS NEEDED
Status: DISCONTINUED | OUTPATIENT
Start: 2021-08-04 | End: 2021-08-04 | Stop reason: SURG

## 2021-08-04 RX ORDER — KETOROLAC TROMETHAMINE 30 MG/ML
INJECTION, SOLUTION INTRAMUSCULAR; INTRAVENOUS AS NEEDED
Status: DISCONTINUED | OUTPATIENT
Start: 2021-08-04 | End: 2021-08-04 | Stop reason: SURG

## 2021-08-04 RX ORDER — LIDOCAINE HYDROCHLORIDE 20 MG/ML
INJECTION, SOLUTION EPIDURAL; INFILTRATION; INTRACAUDAL; PERINEURAL AS NEEDED
Status: DISCONTINUED | OUTPATIENT
Start: 2021-08-04 | End: 2021-08-04

## 2021-08-04 RX ORDER — OXYTOCIN/0.9 % SODIUM CHLORIDE 30/500 ML
85 PLASTIC BAG, INJECTION (ML) INTRAVENOUS ONCE
Status: COMPLETED | OUTPATIENT
Start: 2021-08-04 | End: 2021-08-04

## 2021-08-04 RX ORDER — LIDOCAINE HYDROCHLORIDE AND EPINEPHRINE 15; 5 MG/ML; UG/ML
INJECTION, SOLUTION EPIDURAL AS NEEDED
Status: DISCONTINUED | OUTPATIENT
Start: 2021-08-04 | End: 2021-08-04 | Stop reason: SURG

## 2021-08-04 RX ORDER — BUPIVACAINE HCL/0.9 % NACL/PF 0.1 %
2 PLASTIC BAG, INJECTION (ML) EPIDURAL EVERY 8 HOURS
Status: COMPLETED | OUTPATIENT
Start: 2021-08-04 | End: 2021-08-05

## 2021-08-04 RX ORDER — BUPIVACAINE HCL/0.9 % NACL/PF 0.1 %
2 PLASTIC BAG, INJECTION (ML) EPIDURAL ONCE
Status: COMPLETED | OUTPATIENT
Start: 2021-08-04 | End: 2021-08-04

## 2021-08-04 RX ORDER — SODIUM CHLORIDE 0.9 % (FLUSH) 0.9 %
3 SYRINGE (ML) INJECTION EVERY 12 HOURS SCHEDULED
Status: DISCONTINUED | OUTPATIENT
Start: 2021-08-04 | End: 2021-08-04

## 2021-08-04 RX ORDER — ONDANSETRON 2 MG/ML
4 INJECTION INTRAMUSCULAR; INTRAVENOUS EVERY 6 HOURS PRN
Status: DISCONTINUED | OUTPATIENT
Start: 2021-08-04 | End: 2021-08-05 | Stop reason: HOSPADM

## 2021-08-04 RX ORDER — LIDOCAINE HYDROCHLORIDE 20 MG/ML
INJECTION, SOLUTION EPIDURAL; INFILTRATION; INTRACAUDAL; PERINEURAL AS NEEDED
Status: DISCONTINUED | OUTPATIENT
Start: 2021-08-04 | End: 2021-08-04 | Stop reason: SURG

## 2021-08-04 RX ORDER — SIMETHICONE 80 MG
80 TABLET,CHEWABLE ORAL 4 TIMES DAILY PRN
Status: DISCONTINUED | OUTPATIENT
Start: 2021-08-04 | End: 2021-08-08 | Stop reason: HOSPADM

## 2021-08-04 RX ORDER — KETOROLAC TROMETHAMINE 30 MG/ML
30 INJECTION, SOLUTION INTRAMUSCULAR; INTRAVENOUS EVERY 6 HOURS
Status: COMPLETED | OUTPATIENT
Start: 2021-08-04 | End: 2021-08-05

## 2021-08-04 RX ORDER — SODIUM CHLORIDE 0.9 % (FLUSH) 0.9 %
3-10 SYRINGE (ML) INJECTION AS NEEDED
Status: DISCONTINUED | OUTPATIENT
Start: 2021-08-04 | End: 2021-08-04

## 2021-08-04 RX ORDER — ONDANSETRON 2 MG/ML
4 INJECTION INTRAMUSCULAR; INTRAVENOUS ONCE AS NEEDED
Status: DISCONTINUED | OUTPATIENT
Start: 2021-08-04 | End: 2021-08-04 | Stop reason: SDUPTHER

## 2021-08-04 RX ORDER — HYDROCORTISONE 25 MG/G
CREAM TOPICAL 3 TIMES DAILY PRN
Status: DISCONTINUED | OUTPATIENT
Start: 2021-08-04 | End: 2021-08-08 | Stop reason: HOSPADM

## 2021-08-04 RX ORDER — PRENATAL VIT/IRON FUM/FOLIC AC 27MG-0.8MG
1 TABLET ORAL DAILY
Status: DISCONTINUED | OUTPATIENT
Start: 2021-08-05 | End: 2021-08-08 | Stop reason: HOSPADM

## 2021-08-04 RX ORDER — NALOXONE HCL 0.4 MG/ML
0.1 VIAL (ML) INJECTION
Status: DISCONTINUED | OUTPATIENT
Start: 2021-08-04 | End: 2021-08-08 | Stop reason: HOSPADM

## 2021-08-04 RX ORDER — ONDANSETRON 2 MG/ML
INJECTION INTRAMUSCULAR; INTRAVENOUS AS NEEDED
Status: DISCONTINUED | OUTPATIENT
Start: 2021-08-04 | End: 2021-08-04 | Stop reason: SURG

## 2021-08-04 RX ORDER — HYDROMORPHONE HCL IN 0.9% NACL 0.2 MG/ML
PLASTIC BAG, INJECTION (ML) INTRAVENOUS CONTINUOUS
Status: DISPENSED | OUTPATIENT
Start: 2021-08-04 | End: 2021-08-07

## 2021-08-04 RX ORDER — NALOXONE HCL 0.4 MG/ML
0.2 VIAL (ML) INJECTION
Status: DISCONTINUED | OUTPATIENT
Start: 2021-08-04 | End: 2021-08-08 | Stop reason: HOSPADM

## 2021-08-04 RX ORDER — OXYTOCIN/0.9 % SODIUM CHLORIDE 30/500 ML
650 PLASTIC BAG, INJECTION (ML) INTRAVENOUS ONCE
Status: COMPLETED | OUTPATIENT
Start: 2021-08-04 | End: 2021-08-04

## 2021-08-04 RX ORDER — PROMETHAZINE HYDROCHLORIDE 12.5 MG/1
12.5 SUPPOSITORY RECTAL EVERY 6 HOURS PRN
Status: DISCONTINUED | OUTPATIENT
Start: 2021-08-04 | End: 2021-08-05 | Stop reason: HOSPADM

## 2021-08-04 RX ORDER — LANOLIN 100 %
OINTMENT (GRAM) TOPICAL
Status: DISCONTINUED | OUTPATIENT
Start: 2021-08-04 | End: 2021-08-08 | Stop reason: HOSPADM

## 2021-08-04 RX ORDER — BUPIVACAINE HYDROCHLORIDE 2.5 MG/ML
INJECTION, SOLUTION EPIDURAL; INFILTRATION; INTRACAUDAL AS NEEDED
Status: DISCONTINUED | OUTPATIENT
Start: 2021-08-04 | End: 2021-08-04 | Stop reason: SURG

## 2021-08-04 RX ORDER — FENTANYL CITRATE 50 UG/ML
INJECTION, SOLUTION INTRAMUSCULAR; INTRAVENOUS AS NEEDED
Status: DISCONTINUED | OUTPATIENT
Start: 2021-08-04 | End: 2021-08-04 | Stop reason: SURG

## 2021-08-04 RX ORDER — MAGNESIUM SULFATE HEPTAHYDRATE 40 MG/ML
2 INJECTION, SOLUTION INTRAVENOUS CONTINUOUS
Status: DISCONTINUED | OUTPATIENT
Start: 2021-08-04 | End: 2021-08-07

## 2021-08-04 RX ORDER — METHYLERGONOVINE MALEATE 0.2 MG/ML
200 INJECTION INTRAVENOUS ONCE AS NEEDED
Status: DISCONTINUED | OUTPATIENT
Start: 2021-08-04 | End: 2021-08-05 | Stop reason: HOSPADM

## 2021-08-04 RX ORDER — ONDANSETRON 4 MG/1
4 TABLET, FILM COATED ORAL EVERY 6 HOURS PRN
Status: DISCONTINUED | OUTPATIENT
Start: 2021-08-04 | End: 2021-08-05 | Stop reason: HOSPADM

## 2021-08-04 RX ORDER — DIPHENHYDRAMINE HYDROCHLORIDE 50 MG/ML
25 INJECTION INTRAMUSCULAR; INTRAVENOUS EVERY 4 HOURS PRN
Status: DISCONTINUED | OUTPATIENT
Start: 2021-08-04 | End: 2021-08-08 | Stop reason: HOSPADM

## 2021-08-04 RX ORDER — OXYCODONE HYDROCHLORIDE 5 MG/1
5 TABLET ORAL EVERY 4 HOURS PRN
Status: DISCONTINUED | OUTPATIENT
Start: 2021-08-04 | End: 2021-08-08 | Stop reason: HOSPADM

## 2021-08-04 RX ORDER — CARBOPROST TROMETHAMINE 250 UG/ML
250 INJECTION, SOLUTION INTRAMUSCULAR AS NEEDED
Status: DISCONTINUED | OUTPATIENT
Start: 2021-08-04 | End: 2021-08-05 | Stop reason: HOSPADM

## 2021-08-04 RX ORDER — PROPOFOL 10 MG/ML
VIAL (ML) INTRAVENOUS AS NEEDED
Status: DISCONTINUED | OUTPATIENT
Start: 2021-08-04 | End: 2021-08-04 | Stop reason: SURG

## 2021-08-04 RX ORDER — IBUPROFEN 800 MG/1
800 TABLET ORAL EVERY 8 HOURS SCHEDULED
Status: DISCONTINUED | OUTPATIENT
Start: 2021-08-05 | End: 2021-08-08 | Stop reason: HOSPADM

## 2021-08-04 RX ORDER — OXYCODONE HYDROCHLORIDE 5 MG/1
10 TABLET ORAL EVERY 6 HOURS PRN
Status: DISCONTINUED | OUTPATIENT
Start: 2021-08-04 | End: 2021-08-08 | Stop reason: HOSPADM

## 2021-08-04 RX ORDER — PROMETHAZINE HYDROCHLORIDE 12.5 MG/1
12.5 TABLET ORAL EVERY 6 HOURS PRN
Status: DISCONTINUED | OUTPATIENT
Start: 2021-08-04 | End: 2021-08-05 | Stop reason: HOSPADM

## 2021-08-04 RX ORDER — MISOPROSTOL 200 UG/1
1000 TABLET ORAL AS NEEDED
Status: DISCONTINUED | OUTPATIENT
Start: 2021-08-04 | End: 2021-08-05 | Stop reason: HOSPADM

## 2021-08-04 RX ORDER — TRISODIUM CITRATE DIHYDRATE AND CITRIC ACID MONOHYDRATE 500; 334 MG/5ML; MG/5ML
30 SOLUTION ORAL ONCE
Status: COMPLETED | OUTPATIENT
Start: 2021-08-04 | End: 2021-08-04

## 2021-08-04 RX ORDER — OXYTOCIN/0.9 % SODIUM CHLORIDE 30/500 ML
PLASTIC BAG, INJECTION (ML) INTRAVENOUS
Status: COMPLETED
Start: 2021-08-04 | End: 2021-08-04

## 2021-08-04 RX ORDER — ACETAMINOPHEN 500 MG
1000 TABLET ORAL EVERY 8 HOURS
Status: DISCONTINUED | OUTPATIENT
Start: 2021-08-04 | End: 2021-08-08 | Stop reason: HOSPADM

## 2021-08-04 RX ORDER — DIPHENHYDRAMINE HCL 25 MG
25 CAPSULE ORAL EVERY 4 HOURS PRN
Status: DISCONTINUED | OUTPATIENT
Start: 2021-08-04 | End: 2021-08-08 | Stop reason: HOSPADM

## 2021-08-04 RX ORDER — OXYTOCIN/0.9 % SODIUM CHLORIDE 30/500 ML
650 PLASTIC BAG, INJECTION (ML) INTRAVENOUS ONCE
Status: DISCONTINUED | OUTPATIENT
Start: 2021-08-04 | End: 2021-08-04

## 2021-08-04 RX ORDER — LIDOCAINE HYDROCHLORIDE 10 MG/ML
5 INJECTION, SOLUTION EPIDURAL; INFILTRATION; INTRACAUDAL; PERINEURAL AS NEEDED
Status: DISCONTINUED | OUTPATIENT
Start: 2021-08-04 | End: 2021-08-04

## 2021-08-04 RX ORDER — MISOPROSTOL 200 UG/1
800 TABLET ORAL AS NEEDED
Status: DISCONTINUED | OUTPATIENT
Start: 2021-08-04 | End: 2021-08-04

## 2021-08-04 RX ADMIN — Medication 85 ML/HR: at 16:13

## 2021-08-04 RX ADMIN — OXYTOCIN-SODIUM CHLORIDE 0.9% IV SOLN 30 UNIT/500ML 650 ML/HR: 30-0.9/5 SOLUTION at 14:45

## 2021-08-04 RX ADMIN — Medication 2 G: at 14:35

## 2021-08-04 RX ADMIN — SODIUM CHLORIDE, POTASSIUM CHLORIDE, SODIUM LACTATE AND CALCIUM CHLORIDE: 600; 310; 30; 20 INJECTION, SOLUTION INTRAVENOUS at 15:09

## 2021-08-04 RX ADMIN — LIDOCAINE HYDROCHLORIDE 10 ML: 20 INJECTION, SOLUTION EPIDURAL; INFILTRATION; INTRACAUDAL; PERINEURAL at 14:44

## 2021-08-04 RX ADMIN — PHENYLEPHRINE HYDROCHLORIDE 100 MCG: 10 INJECTION INTRAVENOUS at 15:00

## 2021-08-04 RX ADMIN — PHENYLEPHRINE HYDROCHLORIDE 100 MCG: 10 INJECTION INTRAVENOUS at 15:06

## 2021-08-04 RX ADMIN — KETOROLAC TROMETHAMINE 30 MG: 30 INJECTION, SOLUTION INTRAMUSCULAR at 15:21

## 2021-08-04 RX ADMIN — PROPOFOL 30 MG: 10 INJECTION, EMULSION INTRAVENOUS at 14:46

## 2021-08-04 RX ADMIN — ACETAMINOPHEN 1000 MG: 500 TABLET, FILM COATED ORAL at 21:53

## 2021-08-04 RX ADMIN — BUPIVACAINE HYDROCHLORIDE 10 ML: 2.5 INJECTION, SOLUTION EPIDURAL; INFILTRATION; INTRACAUDAL; PERINEURAL at 05:46

## 2021-08-04 RX ADMIN — SODIUM CITRATE AND CITRIC ACID MONOHYDRATE 30 ML: 500; 334 SOLUTION ORAL at 14:15

## 2021-08-04 RX ADMIN — PROPOFOL 20 MG: 10 INJECTION, EMULSION INTRAVENOUS at 14:55

## 2021-08-04 RX ADMIN — LIDOCAINE HYDROCHLORIDE AND EPINEPHRINE 3 ML: 15; 5 INJECTION, SOLUTION EPIDURAL at 05:40

## 2021-08-04 RX ADMIN — PHENYLEPHRINE HYDROCHLORIDE 100 MCG: 10 INJECTION INTRAVENOUS at 14:47

## 2021-08-04 RX ADMIN — HYDROMORPHONE HYDROCHLORIDE: 2 INJECTION INTRAMUSCULAR; INTRAVENOUS; SUBCUTANEOUS at 16:48

## 2021-08-04 RX ADMIN — PROPOFOL 30 MG: 10 INJECTION, EMULSION INTRAVENOUS at 14:49

## 2021-08-04 RX ADMIN — NIFEDIPINE 90 MG: 30 TABLET, FILM COATED, EXTENDED RELEASE ORAL at 08:09

## 2021-08-04 RX ADMIN — Medication 10 ML/HR: at 05:54

## 2021-08-04 RX ADMIN — KETOROLAC TROMETHAMINE 30 MG: 30 INJECTION, SOLUTION INTRAMUSCULAR; INTRAVENOUS at 21:52

## 2021-08-04 RX ADMIN — MAGNESIUM SULFATE HEPTAHYDRATE 2 G/HR: 40 INJECTION, SOLUTION INTRAVENOUS at 16:12

## 2021-08-04 RX ADMIN — HYDRALAZINE HYDROCHLORIDE 10 MG: 20 INJECTION INTRAMUSCULAR; INTRAVENOUS at 10:43

## 2021-08-04 RX ADMIN — OXYCODONE 10 MG: 5 TABLET ORAL at 22:01

## 2021-08-04 RX ADMIN — SODIUM CHLORIDE, POTASSIUM CHLORIDE, SODIUM LACTATE AND CALCIUM CHLORIDE 999 ML/HR: 600; 310; 30; 20 INJECTION, SOLUTION INTRAVENOUS at 14:16

## 2021-08-04 RX ADMIN — MAGNESIUM SULFATE HEPTAHYDRATE 2 G/HR: 40 INJECTION, SOLUTION INTRAVENOUS at 09:04

## 2021-08-04 RX ADMIN — BUPIVACAINE HYDROCHLORIDE 10 ML: 2.5 INJECTION, SOLUTION EPIDURAL; INFILTRATION; INTRACAUDAL; PERINEURAL at 14:29

## 2021-08-04 RX ADMIN — HYDRALAZINE HYDROCHLORIDE 5 MG: 20 INJECTION INTRAMUSCULAR; INTRAVENOUS at 09:55

## 2021-08-04 RX ADMIN — SODIUM CHLORIDE 3 MILLION UNITS: 9 INJECTION, SOLUTION INTRAVENOUS at 04:09

## 2021-08-04 RX ADMIN — AZITHROMYCIN 500 MG: 500 INJECTION, POWDER, LYOPHILIZED, FOR SOLUTION INTRAVENOUS at 14:50

## 2021-08-04 RX ADMIN — FENTANYL CITRATE 50 MCG: 50 INJECTION INTRAMUSCULAR; INTRAVENOUS at 14:49

## 2021-08-04 RX ADMIN — SODIUM CHLORIDE 3 MILLION UNITS: 9 INJECTION, SOLUTION INTRAVENOUS at 08:09

## 2021-08-04 RX ADMIN — BUPIVACAINE HYDROCHLORIDE 10 ML: 2.5 INJECTION, SOLUTION EPIDURAL; INFILTRATION; INTRACAUDAL; PERINEURAL at 14:00

## 2021-08-04 RX ADMIN — ONDANSETRON HYDROCHLORIDE 8 MG: 2 INJECTION INTRAMUSCULAR; INTRAVENOUS at 14:30

## 2021-08-04 RX ADMIN — SODIUM CHLORIDE 3 MILLION UNITS: 9 INJECTION, SOLUTION INTRAVENOUS at 12:00

## 2021-08-04 RX ADMIN — MISOPROSTOL 1000 MCG: 200 TABLET ORAL at 15:18

## 2021-08-04 RX ADMIN — MAGNESIUM SULFATE HEPTAHYDRATE 2 G/HR: 40 INJECTION, SOLUTION INTRAVENOUS at 22:01

## 2021-08-04 RX ADMIN — PHENYLEPHRINE HYDROCHLORIDE 100 MCG: 10 INJECTION INTRAVENOUS at 15:03

## 2021-08-04 RX ADMIN — OXYTOCIN-SODIUM CHLORIDE 0.9% IV SOLN 30 UNIT/500ML 85 ML/HR: 30-0.9/5 SOLUTION at 15:15

## 2021-08-04 RX ADMIN — ONDANSETRON 4 MG: 2 INJECTION INTRAMUSCULAR; INTRAVENOUS at 06:02

## 2021-08-04 RX ADMIN — OXYTOCIN-SODIUM CHLORIDE 0.9% IV SOLN 30 UNIT/500ML 85 ML/HR: 30-0.9/5 SOLUTION at 16:13

## 2021-08-04 RX ADMIN — BUPIVACAINE HYDROCHLORIDE 8 ML: 2.5 INJECTION, SOLUTION EPIDURAL; INFILTRATION; INTRACAUDAL; PERINEURAL at 10:57

## 2021-08-04 NOTE — ANESTHESIA PREPROCEDURE EVALUATION
Anesthesia Evaluation     Patient summary reviewed and Nursing notes reviewed   no history of anesthetic complications:  NPO Solid Status: > 8 hours  NPO Liquid Status: < 2 hours           Airway   Mallampati: II  TM distance: >3 FB  Neck ROM: full  Possible difficult intubation  Dental - normal exam     Pulmonary - negative pulmonary ROS and normal exam   Cardiovascular - normal exam    (+) hypertension poorly controlled,       Neuro/Psych  (+) psychiatric history Depression,     GI/Hepatic/Renal/Endo    (+) morbid obesity, GERD poorly controlled,  diabetes mellitus gestational using insulin,     Musculoskeletal (-) negative ROS    Abdominal    Substance History - negative use     OB/GYN    (+) Pregnant, Preeclampsia, pregnancy induced hypertension        Other - negative ROS       ROS/Med Hx Other: Polyhydraminos    Plt 264                  Anesthesia Plan    ASA 3     epidural       Anesthetic plan, all risks, benefits, and alternatives have been provided, discussed and informed consent has been obtained with: patient.

## 2021-08-04 NOTE — L&D DELIVERY NOTE
[facility]  Vaginal Delivery Note    Delivery     Delivery: , Low Transverse     YOB: 2023    Time of Birth: 2:45 PM      Anesthesia: Epidural     Delivering clinician:  Maximiliano estrada DO      Delivery narrative: 21-year-old G1, P0 at 35 weeks with preeclampsia with severe features undergoing induction of labor had arrest of dilation.  Was taken for primary low transverse  section.  Please see operative report for full details.  Complications  Preeclampsia    Infant    Findings: female  infant     Infant observations: Weight: 2380 g (5 lb 4 oz)   Length: 18.898  in  Observations/Comments:        Apgars: 7  @ 1 minute /    8  @ 5 minutes     Placenta, Cord, and Fluid    Placenta delivered  Expressed  at   2021  2:46 PM     Cord: 3 vessels  present.   Nuchal Cord?  no   Cord blood obtained: Yes    Cord gases obtained:  No      Estimated Blood Loss:  700 and     Disposition  Mother to Mother Baby/Postpartum  in stable condition currently.  Baby to NICU  in stable condition currently.

## 2021-08-04 NOTE — ANESTHESIA PROCEDURE NOTES
Labor Epidural      Patient reassessed immediately prior to procedure    Patient location during procedure: OB  Start Time: 8/4/2021 5:33 AM  Stop Time: 8/4/2021 5:40 AM  Indication:at surgeon's request  Performed By  CRNA: Nicole Valadez CRNA  Preanesthetic Checklist  Completed: patient identified, IV checked, site marked, risks and benefits discussed, surgical consent, monitors and equipment checked, pre-op evaluation and timeout performed  Prep:  Pt Position:sitting  Sterile Tech:cap, gloves, mask and sterile barrier  Prep:povidone-iodine 7.5% surgical scrub  Monitoring:blood pressure monitoring and continuous pulse oximetry  Epidural Block Procedure:  Approach:midline  Guidance:landmark technique  Location:L3-L4  Needle Type:Tuohy  Needle Gauge:19 G  Loss of Resistance Medium: saline  Loss of Resistance: 8cm  Cath Depth at skin:13 cm  Paresthesia: none  Aspiration:negative  Test Dose:negative  Number of Attempts: 1  Post Assessment:  Dressing:occlusive dressing applied and secured with tape  Pt Tolerance:patient tolerated the procedure well with no apparent complications  Complications:no

## 2021-08-04 NOTE — PLAN OF CARE
Goal Outcome Evaluation:  Plan of Care Reviewed With: patient, spouse        Progress: improving  Outcome Summary: [place] bulb in place, most recent blood pressure is stable but it's been hard to control, blood sugar did not require coverage

## 2021-08-04 NOTE — OP NOTE
Section Procedure Note    Robyn Camara    2021     Indications: failure to progress: arrest of dilation    Pre-operative Diagnosis: 21-year-old  at 35 weeks and 0 days with preeclampsia with severe features undergoing induction.  Had arrest of dilation at 5 cm.    Post-operative Diagnosis: Arrest of dilation    PROCEDURES PERFORMED: Procedure(s):   SECTION PRIMARY, Dr. Calderon    SURGEON: Maximiliano Calderon DO, FACOG    Assistant: Liz Geronimo CSA was responsible for performing the following activities: Retraction, Suction, Suturing, Closing, Placing Dressing and Delivery of Fetus and their skilled assistance was necessary for the success of this case.     STAFF:   Circulator: Annabel Wilson RN  Scrub Person: Sri Morales & ESVIN Nurse: Petra Jeter RN  NICU Nurse: Rica Camara RN  Assistant: Liz Geronimo CSA    ANESTHESIA: Spinal    ANESTHESIA STAFF:  CRNA: Nicole Valadez CRNA; Jodie Lizama CRNA  Student Nurse Anesthetist: Mahad Garces SRNA    Findings: Normal-appearing uterus tubes and ovaries.  Female infant in the cephalic presentation.    Birth Information  YOB: 2023   Time of birth: 2:45 PM   Delivering clinician:     Sex: female   Delivery type: , Low Transverse   Breech type (if applicable):     Observed anomalies/comments:         Estimated Blood Loss: 700mL           Drains: [place]                 Specimens: Placenta               Complications:  None; patient tolerated the procedure well.           Disposition: Mother Baby Unit           Condition: stable    Procedure Details     After obtaining informed consent, the patient was taken to the operating room where epidural anesthesia was found to be adequate. Preoperative antibiotics were given.  A [place] catheter and bilateral sequential compression devices were placed.  She was then prepped and draped in the normal sterile fashion in the dorsal supine position with a  left lateral tilt. A final time out was performed. A Pfannenstiel skin incision was then made with the scalpel and carried through to the underlying layer of fascia.  The fascia was incised in the midline and the incision extended laterally with traction.  The rectus muscles were then  in the midline, and the peritoneum was entered digitally.   The peritoneal incision was then extended superiorly and inferiorly with good visualization of the bladder. Edgar retractor was placed without difficulty.    The lower uterine segment scored in a transverse fashion with the scalpel.  The uterus was then entered bluntly and the incision extended w/ traction.  The infant’s head was elevated to the level of the incision.  Fundal pressure was applied. The head was delivered atraumatically in OA position.  The anterior shoulder, posterior shoulder, and corpus were delivered without difficulty. The infant was handed off to waiting pediatricians.      The placenta was then removed manually, the uterus exteriorized, and cleared of all clots and debris.  The uterine incision was repaired with 0 Vicryl in a running, locking fashion.  An imbricating layer was then performed with #1 chromic.  The uterine incision was inspected and hemostasis was noted. Posterior cul-de-sac was suctioned and uterus returned to the abdomen.  The gutters were cleared of all clots and irrigated with excellent hemostasis noted. The rectus muscles were then reapproximated using 2-0 plain gut with 3 interrupted sutures. The fascia was reapproximated with 0 Vicryl in a running fashion. Sven's fascia was closed with 3-0 Vicryl in a running fashion. The skin was closed using 3-0 Monocryl and Dermabond.    The patient tolerated the procedure well.  Sponge, lap, and needle counts were correct times two. Vaginal sweep was completed.  The patient was taken to the recovery room in stable condition.      This document has been electronically signed by  Maximiliano Calderon,  on August 4, 2021 17:03 CDT

## 2021-08-04 NOTE — PLAN OF CARE
Goal Outcome Evaluation:  Plan of Care Reviewed With: patient, spouse        Progress: improving  Outcome Summary: VSS at this time, pt states pca is helping pain, hall in place, output adequate, fundus firm, rubra scant to light  Problem: Bleeding (Labor)  Goal: Hemostasis  8/4/2021 1700 by Annabel Wilson RN  Outcome: Unable to Meet, Plan Revised  8/4/2021 0722 by Annabel Wilson RN  Outcome: Ongoing, Progressing     Problem: Change in Fetal Wellbeing (Labor)  Goal: Stable Fetal Wellbeing  8/4/2021 1700 by Annabel Wilson RN  Outcome: Unable to Meet, Plan Revised  8/4/2021 0722 by Annabel Wilson RN  Outcome: Ongoing, Progressing     Problem: Delayed Labor Progression (Labor)  Goal: Effective Progression to Delivery  8/4/2021 1700 by Annabel Wilson RN  Outcome: Unable to Meet, Plan Revised  8/4/2021 0722 by Annabel Wilson RN  Outcome: Ongoing, Progressing     Problem: Infection (Labor)  Goal: Absence of Infection Signs and Symptoms  8/4/2021 1700 by Annabel Wilson RN  Outcome: Unable to Meet, Plan Revised  8/4/2021 0722 by Annabel Wilson RN  Outcome: Ongoing, Progressing     Problem: Labor Pain (Labor)  Goal: Acceptable Pain Control  8/4/2021 1700 by Annabel Wilson RN  Outcome: Unable to Meet, Plan Revised  8/4/2021 0722 by Annabel Wilson RN  Outcome: Ongoing, Progressing     Problem: Uterine Tachysystole (Labor)  Goal: Normal Uterine Contraction Pattern  8/4/2021 1700 by Annabel Wilson RN  Outcome: Unable to Meet, Plan Revised  8/4/2021 0722 by Annabel Wilson RN  Outcome: Ongoing, Progressing

## 2021-08-04 NOTE — PROGRESS NOTES
Patient has not made any progress for 4 to 5 hours.  She is remained approximately 5 cm since just after her water was broken.  Given that the patient has not made any progress from this point she now meets criteria for arrest of dilation.  Recommended the patient undergo primary  section for the same she is agreeable to this.    The risks, benefits. and alternatives to  section were discussed.  The risks that were discussed included, but were not limited to, pain, infection, bleeding, hemorrhage,  injury to the bowel, bladder, uterus, tubes, ovaries, or baby which could require further surgery or prolonged hospitalization.  Remote possibility of hysterectomy and/or salpingo-oophorectomy. Remote possibility of death of baby and/or mother. I discussed the risk of bleeding with the patient and possible risk of blood transfusion.  Blood products carry risk of HIV, infection, hepatitis, and transfusion reaction. Patient is willing to accept blood products. The patient understands that SCD's will be placed on her legs to try and reduce the risk of clot formation in her legs.  She understands that we will have early ambulation to also reduce the risk of blood clot formation and to reduce the risk of pneumonia.  She will be given antibiotics prior to her surgery to help decrease the risk for infection.  All questions were answered. Patient express understanding and desires to proceed with surgery.

## 2021-08-04 NOTE — ANESTHESIA POSTPROCEDURE EVALUATION
Patient: Robyn Camara    Procedure Summary     Date: 21 Room / Location: [facility] LABOR DELIVERY  [facility] LABOR DELIVERY    Anesthesia Start: 528 Anesthesia Stop:     Procedure:  SECTION PRIMARY, Dr. Calderon (N/A Abdomen) Diagnosis:       Severe pre-eclampsia in third trimester      (Severe pre-eclampsia in third trimester [O14.13])    Surgeons: Maximiliano Calderon DO Provider: Jodie Lizama CRNA    Anesthesia Type: epidural ASA Status: 3          Anesthesia Type: epidural    Vitals  Vitals Value Taken Time   /50 21 1542   Temp 97.9 °F (36.6 °C) 21 1528   Pulse 63 21 1542   Resp 20 21 1528   SpO2 99 % 21 1542   Vitals shown include unvalidated device data.        Post Anesthesia Care and Evaluation    Patient location during evaluation: bedside  Patient participation: complete - patient participated  Level of consciousness: awake and alert  Pain management: satisfactory to patient  Airway patency: patent  Anesthetic complications: No anesthetic complications  PONV Status: none  Cardiovascular status: stable and hypotensive  Respiratory status: acceptable, room air and spontaneous ventilation  Hydration status: acceptable  Post Neuraxial Block status: No signs or symptoms of PDPH  Comments: BP: 73/50, treated with 100 mcg phenylephrine.  Repeat /54  HR: 115  RR: 16  Temp: 97.9 F

## 2021-08-04 NOTE — PLAN OF CARE
Goal Outcome Evaluation:  Plan of Care Reviewed With: patient        Progress: improving  Outcome Summary: Pt 4 cm/75% and -2 at this time. Pt epidural going at this time and pt comfortable. Pitocin running at 4milliunits at this time. hall catheter in place. pt magnesium going at 2g/hr. DTR 2+ and output has been adequate.VSS, no difficulty breathing and lung sounds are clear. Pt vu of all information given.

## 2021-08-05 LAB
BASOPHILS # BLD AUTO: 0.06 10*3/MM3 (ref 0–0.2)
BASOPHILS NFR BLD AUTO: 0.3 % (ref 0–1.5)
DEPRECATED RDW RBC AUTO: 51.4 FL (ref 37–54)
EOSINOPHIL # BLD AUTO: 0.05 10*3/MM3 (ref 0–0.4)
EOSINOPHIL NFR BLD AUTO: 0.2 % (ref 0.3–6.2)
ERYTHROCYTE [DISTWIDTH] IN BLOOD BY AUTOMATED COUNT: 16.1 % (ref 12.3–15.4)
HCT VFR BLD AUTO: 31.6 % (ref 34–46.6)
HGB BLD-MCNC: 10.2 G/DL (ref 12–15.9)
IMM GRANULOCYTES # BLD AUTO: 0.13 10*3/MM3 (ref 0–0.05)
IMM GRANULOCYTES NFR BLD AUTO: 0.6 % (ref 0–0.5)
LYMPHOCYTES # BLD AUTO: 2.56 10*3/MM3 (ref 0.7–3.1)
LYMPHOCYTES NFR BLD AUTO: 11.8 % (ref 19.6–45.3)
MCH RBC QN AUTO: 28.3 PG (ref 26.6–33)
MCHC RBC AUTO-ENTMCNC: 32.3 G/DL (ref 31.5–35.7)
MCV RBC AUTO: 87.8 FL (ref 79–97)
MONOCYTES # BLD AUTO: 1.57 10*3/MM3 (ref 0.1–0.9)
MONOCYTES NFR BLD AUTO: 7.2 % (ref 5–12)
NEUTROPHILS NFR BLD AUTO: 17.37 10*3/MM3 (ref 1.7–7)
NEUTROPHILS NFR BLD AUTO: 79.9 % (ref 42.7–76)
NRBC BLD AUTO-RTO: 0 /100 WBC (ref 0–0.2)
PLATELET # BLD AUTO: 272 10*3/MM3 (ref 140–450)
PMV BLD AUTO: 12.1 FL (ref 6–12)
RBC # BLD AUTO: 3.6 10*6/MM3 (ref 3.77–5.28)
WBC # BLD AUTO: 21.74 10*3/MM3 (ref 3.4–10.8)

## 2021-08-05 PROCEDURE — 25010000003 MAGNESIUM SULFATE 20 GM/500ML SOLUTION: Performed by: OBSTETRICS & GYNECOLOGY

## 2021-08-05 PROCEDURE — 94799 UNLISTED PULMONARY SVC/PX: CPT

## 2021-08-05 PROCEDURE — 25010000002 HYDRALAZINE PER 20 MG: Performed by: OBSTETRICS & GYNECOLOGY

## 2021-08-05 PROCEDURE — 85025 COMPLETE CBC W/AUTO DIFF WBC: CPT | Performed by: OBSTETRICS & GYNECOLOGY

## 2021-08-05 PROCEDURE — 25010000002 CEFAZOLIN PER 500 MG: Performed by: OBSTETRICS & GYNECOLOGY

## 2021-08-05 PROCEDURE — 25010000002 KETOROLAC TROMETHAMINE PER 15 MG: Performed by: OBSTETRICS & GYNECOLOGY

## 2021-08-05 PROCEDURE — 0503F POSTPARTUM CARE VISIT: CPT | Performed by: OBSTETRICS & GYNECOLOGY

## 2021-08-05 RX ORDER — LABETALOL 100 MG/1
100 TABLET, FILM COATED ORAL EVERY 12 HOURS SCHEDULED
Status: DISCONTINUED | OUTPATIENT
Start: 2021-08-05 | End: 2021-08-07

## 2021-08-05 RX ORDER — HYDRALAZINE HYDROCHLORIDE 20 MG/ML
5 INJECTION INTRAMUSCULAR; INTRAVENOUS EVERY 6 HOURS PRN
Status: DISCONTINUED | OUTPATIENT
Start: 2021-08-05 | End: 2021-08-08 | Stop reason: HOSPADM

## 2021-08-05 RX ADMIN — ACETAMINOPHEN 1000 MG: 500 TABLET, FILM COATED ORAL at 14:17

## 2021-08-05 RX ADMIN — HYDRALAZINE HYDROCHLORIDE 5 MG: 20 INJECTION INTRAMUSCULAR; INTRAVENOUS at 13:22

## 2021-08-05 RX ADMIN — LABETALOL HYDROCHLORIDE 100 MG: 100 TABLET, FILM COATED ORAL at 13:55

## 2021-08-05 RX ADMIN — ACETAMINOPHEN 1000 MG: 500 TABLET, FILM COATED ORAL at 05:12

## 2021-08-05 RX ADMIN — CEFAZOLIN 2 G: 10 INJECTION, POWDER, FOR SOLUTION INTRAVENOUS at 13:03

## 2021-08-05 RX ADMIN — KETOROLAC TROMETHAMINE 30 MG: 30 INJECTION, SOLUTION INTRAMUSCULAR; INTRAVENOUS at 16:07

## 2021-08-05 RX ADMIN — OXYCODONE 10 MG: 5 TABLET ORAL at 23:39

## 2021-08-05 RX ADMIN — MAGNESIUM SULFATE HEPTAHYDRATE 2 G/HR: 40 INJECTION, SOLUTION INTRAVENOUS at 10:28

## 2021-08-05 RX ADMIN — OXYCODONE 10 MG: 5 TABLET ORAL at 16:00

## 2021-08-05 RX ADMIN — KETOROLAC TROMETHAMINE 30 MG: 30 INJECTION, SOLUTION INTRAMUSCULAR; INTRAVENOUS at 03:47

## 2021-08-05 RX ADMIN — SIMETHICONE 80 MG: 80 TABLET, CHEWABLE ORAL at 16:00

## 2021-08-05 RX ADMIN — PRENATAL VIT W/ FE FUMARATE-FA TAB 27-0.8 MG 1 TABLET: 27-0.8 TAB at 09:46

## 2021-08-05 RX ADMIN — CEFAZOLIN 2 G: 10 INJECTION, POWDER, FOR SOLUTION INTRAVENOUS at 04:00

## 2021-08-05 RX ADMIN — HYDRALAZINE HYDROCHLORIDE 5 MG: 20 INJECTION INTRAMUSCULAR; INTRAVENOUS at 08:31

## 2021-08-05 RX ADMIN — ACETAMINOPHEN 1000 MG: 500 TABLET, FILM COATED ORAL at 20:48

## 2021-08-05 RX ADMIN — KETOROLAC TROMETHAMINE 30 MG: 30 INJECTION, SOLUTION INTRAMUSCULAR; INTRAVENOUS at 09:46

## 2021-08-05 RX ADMIN — IBUPROFEN 800 MG: 800 TABLET, FILM COATED ORAL at 22:08

## 2021-08-05 RX ADMIN — LABETALOL HYDROCHLORIDE 100 MG: 100 TABLET, FILM COATED ORAL at 20:48

## 2021-08-05 RX ADMIN — NIFEDIPINE 90 MG: 30 TABLET, FILM COATED, EXTENDED RELEASE ORAL at 08:50

## 2021-08-05 NOTE — PLAN OF CARE
Goal Outcome Evaluation:  Plan of Care Reviewed With: patient, spouse        Progress: improving  Outcome Summary: VSS, Magnesium discontinued, started oral procardia, and oral labetalol, transfer to mother baby

## 2021-08-05 NOTE — PROGRESS NOTES
[facility]  Robyn Camara  : 2000  MRN: [id number]  CSN: [id number]    Postpartum Day #1  Subjective   Patient is overall stable this morning, tolerating a regular diet.  Patient has not ambulated much as she is on magnesium at this time.  She is making appropriate urine with [place] catheter in place.  Denies any headache, right upper quadrant pain or vision changes at this time.  Blood pressures have been mostly mild range but patient did just have a severe range when I walked into the room.  Patient states that she has not had a bowel movement yet.  Pain is well controlled this morning.       Objective     Min/max vitals past 24 hours:   Temp  Min: 97.7 °F (36.5 °C)  Max: 99.5 °F (37.5 °C)  BP  Min: 73/50  Max: 169/93  Pulse  Min: 76  Max: 138  Pulse  Min: 76  Max: 138        Abdomen: soft, nontender, bowel sounds normal, no masses   fundus firm and nontender  Incision is clean dry and intact with no drainage or erythema   Calves: Nontender, no cords palpable   Pelvic: deferred     Lab Results   Component Value Date    WBC 21.74 (H) 2021    HGB 10.2 (L) 2021    HCT 31.6 (L) 2021    MCV 87.8 2021     2021    RH Positive 2021    HEPBSAG Negative 2021        Assessment   Postpartum Day #1 S/P  section, doing well and recovering appropriately at this time.  Appropriate drop in H&H after  section.  Vital signs remained mostly mild range however patient did have 1 severe range blood pressure when I went into the room.  She is currently on nifedipine 90 mg daily may have to add labetalol as well.     Plan   1. Continue routine postpartum care  2. Continue postoperative care  3. Encourage ambulation when magnesium is done  4. Regular diet  5. Continue current pain control regimen  6. Continue magnesium for 24 hours  7. Hydralazine for blood pressure control  8. Will likely add labetalol p.o. to blood pressure medication regimen  9. Continue  inpatient management          This document has been electronically signed by Maximiliano Calderon DO on August 5, 2021 06:48 CDT

## 2021-08-06 PROCEDURE — 0503F POSTPARTUM CARE VISIT: CPT | Performed by: OBSTETRICS & GYNECOLOGY

## 2021-08-06 RX ORDER — ECHINACEA PURPUREA EXTRACT 125 MG
1 TABLET ORAL AS NEEDED
Status: DISCONTINUED | OUTPATIENT
Start: 2021-08-06 | End: 2021-08-08 | Stop reason: HOSPADM

## 2021-08-06 RX ADMIN — PRENATAL VIT W/ FE FUMARATE-FA TAB 27-0.8 MG 1 TABLET: 27-0.8 TAB at 08:19

## 2021-08-06 RX ADMIN — OXYCODONE 10 MG: 5 TABLET ORAL at 04:56

## 2021-08-06 RX ADMIN — IBUPROFEN 800 MG: 800 TABLET, FILM COATED ORAL at 14:39

## 2021-08-06 RX ADMIN — IBUPROFEN 800 MG: 800 TABLET, FILM COATED ORAL at 21:05

## 2021-08-06 RX ADMIN — LABETALOL HYDROCHLORIDE 100 MG: 100 TABLET, FILM COATED ORAL at 08:19

## 2021-08-06 RX ADMIN — LABETALOL HYDROCHLORIDE 100 MG: 100 TABLET, FILM COATED ORAL at 20:20

## 2021-08-06 RX ADMIN — OXYCODONE 10 MG: 5 TABLET ORAL at 13:41

## 2021-08-06 RX ADMIN — IBUPROFEN 800 MG: 800 TABLET, FILM COATED ORAL at 06:04

## 2021-08-06 RX ADMIN — ACETAMINOPHEN 1000 MG: 500 TABLET, FILM COATED ORAL at 04:53

## 2021-08-06 RX ADMIN — NIFEDIPINE 90 MG: 30 TABLET, FILM COATED, EXTENDED RELEASE ORAL at 08:19

## 2021-08-06 NOTE — PLAN OF CARE
Goal Outcome Evaluation:  Plan of Care Reviewed With: patient, spouse        Progress: improving  Outcome Summary: vss. ff. scant bleeding. voids. ambulates to NICU. tolerating a regular diet. pain controlled with PO meds

## 2021-08-06 NOTE — PLAN OF CARE
Goal Outcome Evaluation:           Progress: improving  Outcome Summary: VSS, b/p wnl, pain well controlled with prn pain meds, tolerating po intake, voiding well, +flatus

## 2021-08-06 NOTE — PROGRESS NOTES
[facility]  Robyn Camara  : 2000  MRN: [id number]  CSN: [id number]    Postpartum Day #2  Subjective   Patient is overall stable this morning, she is ambulating without difficulty, tolerating a regular diet.  [place] catheter has been removed and she is urinating without difficulty.  Patient states that she has not had a bowel movement yet.  Pain is well controlled this morning.  Uncertain what she wants for contraception     Objective     Min/max vitals past 24 hours:   Temp  Min: 98 °F (36.7 °C)  Max: 98.5 °F (36.9 °C)  BP  Min: 124/65  Max: 170/88  Pulse  Min: 100  Max: 130  Pulse  Min: 100  Max: 130        Abdomen: soft, nontender, bowel sounds normal, no masses   fundus firm and nontender  Incision is clean dry and intact with no drainage or erythema   Calves: Nontender, no cords palpable   Pelvic: deferred     Lab Results   Component Value Date    WBC 21.74 (H) 2021    HGB 10.2 (L) 2021    HCT 31.6 (L) 2021    MCV 87.8 2021     2021    RH Positive 2021    HEPBSAG Negative 2021        Assessment   1. Postpartum Day #2 S/P  section, doing well and recovering appropriately at this time.  Vital signs reviewed and reassuring.  Blood pressures are much better controlled on 90 mg of Procardia XL and 100 mg twice daily of labetalol.     Plan   1. Continue routine postpartum care  2. Continue postoperative care  3. Encourage ambulation and breast-feeding  4. Regular diet  5. Continue current pain control regimen  6. Continue current blood pressure medication regimen          This document has been electronically signed by Maximiliano Calderon DO on 2021 07:00 CDT

## 2021-08-07 LAB
ALBUMIN SERPL-MCNC: 2.5 G/DL (ref 3.5–5.2)
ALBUMIN/GLOB SERPL: 0.8 G/DL
ALP SERPL-CCNC: 96 U/L (ref 39–117)
ALT SERPL W P-5'-P-CCNC: 56 U/L (ref 1–33)
ANION GAP SERPL CALCULATED.3IONS-SCNC: 12 MMOL/L (ref 5–15)
AST SERPL-CCNC: 63 U/L (ref 1–32)
BILIRUB SERPL-MCNC: <0.2 MG/DL (ref 0–1.2)
BUN SERPL-MCNC: 11 MG/DL (ref 6–20)
BUN/CREAT SERPL: 22.9 (ref 7–25)
CALCIUM SPEC-SCNC: 8.6 MG/DL (ref 8.6–10.5)
CHLORIDE SERPL-SCNC: 104 MMOL/L (ref 98–107)
CO2 SERPL-SCNC: 20 MMOL/L (ref 22–29)
CREAT SERPL-MCNC: 0.48 MG/DL (ref 0.57–1)
DEPRECATED RDW RBC AUTO: 49.1 FL (ref 37–54)
ERYTHROCYTE [DISTWIDTH] IN BLOOD BY AUTOMATED COUNT: 15.8 % (ref 12.3–15.4)
GFR SERPL CREATININE-BSD FRML MDRD: >150 ML/MIN/1.73
GLOBULIN UR ELPH-MCNC: 3.2 GM/DL
GLUCOSE SERPL-MCNC: 114 MG/DL (ref 65–99)
HCT VFR BLD AUTO: 30.1 % (ref 34–46.6)
HGB BLD-MCNC: 9.9 G/DL (ref 12–15.9)
MCH RBC QN AUTO: 28.4 PG (ref 26.6–33)
MCHC RBC AUTO-ENTMCNC: 32.9 G/DL (ref 31.5–35.7)
MCV RBC AUTO: 86.5 FL (ref 79–97)
PLATELET # BLD AUTO: 314 10*3/MM3 (ref 140–450)
PMV BLD AUTO: 11.1 FL (ref 6–12)
POTASSIUM SERPL-SCNC: 3.8 MMOL/L (ref 3.5–5.2)
PROT SERPL-MCNC: 5.7 G/DL (ref 6–8.5)
RBC # BLD AUTO: 3.48 10*6/MM3 (ref 3.77–5.28)
SODIUM SERPL-SCNC: 136 MMOL/L (ref 136–145)
WBC # BLD AUTO: 14.51 10*3/MM3 (ref 3.4–10.8)

## 2021-08-07 PROCEDURE — 85027 COMPLETE CBC AUTOMATED: CPT | Performed by: OBSTETRICS & GYNECOLOGY

## 2021-08-07 PROCEDURE — 0503F POSTPARTUM CARE VISIT: CPT | Performed by: OBSTETRICS & GYNECOLOGY

## 2021-08-07 PROCEDURE — 80053 COMPREHEN METABOLIC PANEL: CPT | Performed by: OBSTETRICS & GYNECOLOGY

## 2021-08-07 RX ORDER — LABETALOL 200 MG/1
200 TABLET, FILM COATED ORAL EVERY 12 HOURS SCHEDULED
Status: DISCONTINUED | OUTPATIENT
Start: 2021-08-07 | End: 2021-08-08 | Stop reason: HOSPADM

## 2021-08-07 RX ADMIN — IBUPROFEN 800 MG: 800 TABLET, FILM COATED ORAL at 13:38

## 2021-08-07 RX ADMIN — ACETAMINOPHEN 1000 MG: 500 TABLET, FILM COATED ORAL at 02:46

## 2021-08-07 RX ADMIN — IBUPROFEN 800 MG: 800 TABLET, FILM COATED ORAL at 05:48

## 2021-08-07 RX ADMIN — ACETAMINOPHEN 1000 MG: 500 TABLET, FILM COATED ORAL at 20:44

## 2021-08-07 RX ADMIN — PRENATAL VIT W/ FE FUMARATE-FA TAB 27-0.8 MG 1 TABLET: 27-0.8 TAB at 09:34

## 2021-08-07 RX ADMIN — OXYCODONE 10 MG: 5 TABLET ORAL at 20:48

## 2021-08-07 RX ADMIN — LABETALOL HYDROCHLORIDE 100 MG: 100 TABLET, FILM COATED ORAL at 09:34

## 2021-08-07 RX ADMIN — OXYCODONE 5 MG: 5 TABLET ORAL at 02:46

## 2021-08-07 RX ADMIN — LABETALOL HYDROCHLORIDE 200 MG: 200 TABLET, FILM COATED ORAL at 20:44

## 2021-08-07 RX ADMIN — NIFEDIPINE 90 MG: 30 TABLET, FILM COATED, EXTENDED RELEASE ORAL at 09:34

## 2021-08-07 NOTE — PLAN OF CARE
Problem: Adult Inpatient Plan of Care  Goal: Plan of Care Review  Outcome: Ongoing, Progressing  Flowsheets  Taken 8/7/2021 0618 by Cally Diaz, RN  Progress: improving  Outcome Summary: VSS, fundus firm, lochia light, ambulated in room, visited NICU 1x during shift. Pain controlled with Po pain medications .  Taken 8/6/2021 1713 by Michelle Shukla, RN  Plan of Care Reviewed With:   patient   spouse   Goal Outcome Evaluation:           Progress: improving  Outcome Summary: VSS, fundus firm, lochia light, ambulated in room, visited NICU 1x during shift. Pain controlled with Po pain medications .

## 2021-08-07 NOTE — PROGRESS NOTES
[facility]  Robyn Camara  : 2000  MRN: [id number]  CSN: [id number]    Postpartum Day #3  Subjective   Patient is overall stable this morning, she is ambulating without difficulty, tolerating a regular diet.  [place] catheter has been removed and she is urinating without difficulty.  Patient states that she has not had a bowel movement yet.  Pain is well controlled this morning.  Blood pressures remain mild range but have been close to severe range at times.  Majority have been mild range.  No other symptoms of preeclampsia at this time.     Objective     Min/max vitals past 24 hours:   Temp  Min: 97.6 °F (36.4 °C)  Max: 98.3 °F (36.8 °C)  BP  Min: 135/88  Max: 157/91  Pulse  Min: 115  Max: 125  Pulse  Min: 115  Max: 125        Abdomen: soft, nontender, bowel sounds normal, no masses   fundus firm and nontender  Incision is clean dry and intact with no drainage or erythema   Calves: Nontender, no cords palpable   Pelvic: deferred     Lab Results   Component Value Date    WBC 21.74 (H) 2021    HGB 10.2 (L) 2021    HCT 31.6 (L) 2021    MCV 87.8 2021     2021    RH Positive 2021    HEPBSAG Negative 2021        Assessment   1. Postpartum Day #3 S/P  section, doing well and recovering appropriately at this time.  Vital signs reviewed with mostly mild range blood pressures, no severe range.  No other symptoms of preeclampsia.     Plan   1. Continue routine postpartum care  2. Continue postoperative care  3. Encourage ambulation and breast-feeding  4. Regular diet  5. Continue current pain control regimen  6. Continue Procardia XL 90 mg daily  7. Increase labetalol to 200 mg twice daily  8. Continue inpatient management  9. CBC and CMP tomorrow morning          This document has been electronically signed by Maximiliano Calderon DO on 2021 10:15 CDT

## 2021-08-08 VITALS
DIASTOLIC BLOOD PRESSURE: 66 MMHG | BODY MASS INDEX: 39.94 KG/M2 | HEART RATE: 125 BPM | SYSTOLIC BLOOD PRESSURE: 144 MMHG | HEIGHT: 63 IN | WEIGHT: 225.4 LBS | TEMPERATURE: 98.2 F | OXYGEN SATURATION: 99 % | RESPIRATION RATE: 18 BRPM

## 2021-08-08 PROCEDURE — 93010 ELECTROCARDIOGRAM REPORT: CPT | Performed by: INTERNAL MEDICINE

## 2021-08-08 PROCEDURE — 0503F POSTPARTUM CARE VISIT: CPT | Performed by: OBSTETRICS & GYNECOLOGY

## 2021-08-08 PROCEDURE — 93005 ELECTROCARDIOGRAM TRACING: CPT | Performed by: OBSTETRICS & GYNECOLOGY

## 2021-08-08 RX ORDER — OXYCODONE HYDROCHLORIDE 5 MG/1
5 TABLET ORAL EVERY 4 HOURS PRN
Qty: 18 TABLET | Refills: 0 | Status: SHIPPED | OUTPATIENT
Start: 2021-08-08 | End: 2021-08-11

## 2021-08-08 RX ORDER — IBUPROFEN 800 MG/1
800 TABLET ORAL EVERY 8 HOURS SCHEDULED
Qty: 60 TABLET | Refills: 2 | Status: SHIPPED | OUTPATIENT
Start: 2021-08-08 | End: 2021-09-16 | Stop reason: HOSPADM

## 2021-08-08 RX ORDER — NIFEDIPINE 90 MG/1
90 TABLET, EXTENDED RELEASE ORAL
Qty: 45 TABLET | Refills: 0 | Status: SHIPPED | OUTPATIENT
Start: 2021-08-08 | End: 2021-08-13

## 2021-08-08 RX ORDER — LABETALOL 200 MG/1
200 TABLET, FILM COATED ORAL EVERY 12 HOURS SCHEDULED
Qty: 90 TABLET | Refills: 0 | Status: SHIPPED | OUTPATIENT
Start: 2021-08-08 | End: 2021-08-13

## 2021-08-08 RX ORDER — ACETAMINOPHEN 500 MG
1000 TABLET ORAL EVERY 8 HOURS
Qty: 60 TABLET | Refills: 2 | Status: SHIPPED | OUTPATIENT
Start: 2021-08-08 | End: 2021-09-16 | Stop reason: HOSPADM

## 2021-08-08 RX ADMIN — IBUPROFEN 800 MG: 800 TABLET, FILM COATED ORAL at 05:05

## 2021-08-08 RX ADMIN — LABETALOL HYDROCHLORIDE 200 MG: 200 TABLET, FILM COATED ORAL at 10:18

## 2021-08-08 RX ADMIN — NIFEDIPINE 90 MG: 30 TABLET, FILM COATED, EXTENDED RELEASE ORAL at 10:18

## 2021-08-08 RX ADMIN — PRENATAL VIT W/ FE FUMARATE-FA TAB 27-0.8 MG 1 TABLET: 27-0.8 TAB at 10:18

## 2021-08-08 RX ADMIN — ACETAMINOPHEN 1000 MG: 500 TABLET, FILM COATED ORAL at 05:05

## 2021-08-08 NOTE — PLAN OF CARE
Goal Outcome Evaluation:  Plan of Care Reviewed With: patient        Progress: improving  Outcome Summary: VSS, HR tachy, FF, light bleeding, voids and ambulates, pain controlled with PO medications

## 2021-08-08 NOTE — PROGRESS NOTES
[facility]  Robyn Camara  : 2000  MRN: [id number]  CSN: [id number]    Postpartum Day #4  Subjective   Patient is overall stable this morning, she is ambulating without difficulty, tolerating a regular diet.  [place] catheter has been removed and she is urinating without difficulty.  Patient states that she has not had a bowel movement yet.  Pain is well controlled this morning.  Was having some lightheadedness and dizziness yesterday however CBC and CMP were both normal.  Patient is intermittently tachycardic but no clear reason for tachycardia at this time.  Patient has had episodes of tachycardia in the past.  Patient has no symptoms of preeclampsia at this time.     Objective     Min/max vitals past 24 hours:   Temp  Min: 97.8 °F (36.6 °C)  Max: 98.3 °F (36.8 °C)  BP  Min: 133/72  Max: 145/91  Pulse  Min: 118  Max: 126  Pulse  Min: 118  Max: 126        Abdomen: soft, nontender, bowel sounds normal, no masses   fundus firm and nontender  Incision is clean dry and intact with no drainage or erythema   Calves: Nontender, no cords palpable   Pelvic: deferred     Lab Results   Component Value Date    WBC 14.51 (H) 2021    HGB 9.9 (L) 2021    HCT 30.1 (L) 2021    MCV 86.5 2021     2021    RH Positive 2021    HEPBSAG Negative 2021        Assessment   1. Postpartum Day #4 S/P  section, doing well and recovering appropriately at this time.  Vital signs reviewed and reassuring.  Patient with mostly mild range blood pressures no severe range.  Mild tachycardia although stable and doing well.  Mild elevation in liver enzymes although I do not think that this is due to preeclampsia necessarily.  Elevation is so slight that I do not think there is any concerns going on right now     Plan   1. Continue routine postpartum care  2. Continue postoperative care  3. Encourage ambulation and breast-feeding  4. Regular diet  5. Continue current pain control  regimen          This document has been electronically signed by Maximiliano Calderon DO on August 8, 2021 08:58 CDT

## 2021-08-08 NOTE — DISCHARGE SUMMARY
[facility]  Robyn Camara  : 2000  MRN: [id number]  CSN: [id number]    Discharge Summary    Date of Admission: 8/3/2021  Date of Discharge: 2021    Principle Discharge Dx:  1.  Preeclampsia with severe features  Gestational diabetes  Arrest of dilation  Primary  section    Procedures Performed:  Procedure(s):   SECTION PRIMARY, Dr. Calderon    Brief History:  Patient is a 21 y.o. now  who presented to labor and delivery with preeclampsia with severe features.  Patient was placed on magnesium and blood pressure medications.  Induction of labor was attempted however patient arrested at approximately 5 cm.  Patient was taken for primary .  Had uncomplicated surgery and uncomplicated postoperative course..    Hospital Course:  Her post-operative course was unremarkable.  On POD # 4 she expressed the desire for discharge. She had no febrile morbidity. She had passed gas, and was urinating normally. She was eating a regular diet without difficulty. She was ambulating well. Her incision was clean, dry and intact. Discharge instructions were given.  All questions were answered.    Condition:  Stable  Discharge Activity:    Activity Instructions     Bathing Restrictions      Type of Restriction: Bathing    Bathing Restrictions: Other    Explain Bathing Restrictions: No soaking in bathtub for 4 weeks, showers are fine.    Driving Restrictions      Type of Restriction: Driving    Driving Restrictions: No Driving (Time Limited)    Length: Other    Indicate Length of Restriction: No driving for 1 week or while on narcotic pain medications. Riding is car is fine.    Lifting Restrictions      Type of Restriction: Lifting    Lifting Restrictions: Other    Explain Lifing Restrictions: No lifting more than infant and baby carrier together for 6 weeks.    Pelvic Rest      Nothing in the vagina for 6 weeks to include tampons or intercourse.    Sexual Activity Restrictions      Type  of Restriction: Sex    Explain Sexual Activity Restrictions: No sexual intercourse for at least 6 weeks   Additional Activity Instructions:     No heavy lifting, no driving for 2 weeks or while taking narcotics  Pelvic rest for 6 weeks---no douching, tampons or intercourse  Report to doctor: heavy bleeding or passing large clots, foul odor to discharge, temp above 100.4, burning with urination, gapping or draining from incision/episiotomy, pain in the legs or redness and streaking in your breast, or for pain not relieved by pain medication  Continue taking medicines as prescribed--take prenatal or iron as long as you are breastfeeding  Baby blues are normal--normally occurs 3-4 days after delivery but should not last longer than 2-3 days.   Take rest periods several times during the day  Wear a good supportive bra 24 hours a day to prevent engorgement.          Discharge Diet:  Regular  Discharge Medications:       Your medication list      START taking these medications      Instructions Last Dose Given Next Dose Due   acetaminophen 500 MG tablet  Commonly known as: TYLENOL      Take 2 tablets by mouth Every 8 (Eight) Hours.       ibuprofen 800 MG tablet  Commonly known as: ADVIL,MOTRIN      Take 1 tablet by mouth Every 8 (Eight) Hours.       labetalol 200 MG tablet  Commonly known as: NORMODYNE      Take 1 tablet by mouth Every 12 (Twelve) Hours.       NIFEdipine XL 90 MG 24 hr tablet  Commonly known as: PROCARDIA XL      Take 1 tablet by mouth Daily.       oxyCODONE 5 MG immediate release tablet  Commonly known as: ROXICODONE      Take 1 tablet by mouth Every 4 (Four) Hours As Needed for Moderate Pain  for up to 3 days.          CONTINUE taking these medications      Instructions Last Dose Given Next Dose Due   busPIRone 10 MG tablet  Commonly known as: BUSPAR           FeroSul 325 (65 FE) MG tablet  Generic drug: ferrous sulfate           hydrOXYzine 10 MG tablet  Commonly known as: ATARAX           omeprazole  40 MG capsule  Commonly known as: priLOSEC      Take 1 capsule by mouth Daily for 30 days.       polyethylene glycol 17 GM/SCOOP powder  Commonly known as: MIRALAX           Prenatal 27-1 27-1 MG tablet tablet                 Where to Get Your Medications      You can get these medications from any pharmacy    Bring a paper prescription for each of these medications  · acetaminophen 500 MG tablet  · ibuprofen 800 MG tablet  · labetalol 200 MG tablet  · NIFEdipine XL 90 MG 24 hr tablet  · oxyCODONE 5 MG immediate release tablet       Discharge Disposition:  Home  Follow-Up:  No future appointments.      This document has been electronically signed by Maximiliano Calderon DO on August 8, 2021 09:05 CDT

## 2021-08-09 LAB
QT INTERVAL: 330 MS
QTC INTERVAL: 474 MS

## 2021-08-10 ENCOUNTER — TELEPHONE (OUTPATIENT)
Dept: OBSTETRICS AND GYNECOLOGY | Facility: CLINIC | Age: 21
End: 2021-08-10

## 2021-08-10 LAB
LAB AP CASE REPORT: NORMAL
PATH REPORT.FINAL DX SPEC: NORMAL

## 2021-08-10 NOTE — TELEPHONE ENCOUNTER
Called PT at [phone] to schedule her PP visits. She stated she was at another appointment with her kid she would call back

## 2021-08-13 RX ORDER — NIFEDIPINE 90 MG/1
TABLET, EXTENDED RELEASE ORAL
Qty: 90 TABLET | Refills: 1 | Status: SHIPPED | OUTPATIENT
Start: 2021-08-13 | End: 2021-09-16 | Stop reason: HOSPADM

## 2021-08-13 RX ORDER — LABETALOL 200 MG/1
TABLET, FILM COATED ORAL
Qty: 180 TABLET | Refills: 0 | Status: SHIPPED | OUTPATIENT
Start: 2021-08-13 | End: 2021-09-16 | Stop reason: HOSPADM

## 2021-08-16 ENCOUNTER — TELEPHONE (OUTPATIENT)
Dept: LACTATION | Facility: HOSPITAL | Age: 21
End: 2021-08-16

## 2021-08-16 NOTE — TELEPHONE ENCOUNTER
Lactation follow up call made. Mother states that she started pumping and she is just getting a little bit and she is mixing it with formula. She says she pumps 6x a day. Mother is going to start pumping more and maybe try and latch the baby on occasion.

## 2021-08-19 ENCOUNTER — POSTPARTUM VISIT (OUTPATIENT)
Dept: OBSTETRICS AND GYNECOLOGY | Facility: CLINIC | Age: 21
End: 2021-08-19

## 2021-08-19 VITALS
WEIGHT: 196 LBS | HEIGHT: 63 IN | BODY MASS INDEX: 34.73 KG/M2 | DIASTOLIC BLOOD PRESSURE: 100 MMHG | SYSTOLIC BLOOD PRESSURE: 148 MMHG

## 2021-08-19 PROCEDURE — 0503F POSTPARTUM CARE VISIT: CPT | Performed by: NURSE PRACTITIONER

## 2021-08-19 NOTE — PROGRESS NOTES
"Subjective   No chief complaint on file.    Robyn Camara is a 21 y.o. year old  presenting to be seen for her postpartum visit.  She had a Primary .   Prenatal course was been complicated by GDM on insulin and severe pre-eclampsia . B/P is elevated at 148/100 but patient has not taken her meds yet. Denies headache today, RUQ, or vision changes. Occasionally, has a mild headache. Had a girl name Gracie. Pt has stopped bleeding.     Since delivery she has not been sexually active.  She does not have concerns about post-partum blues/depression. EDPS 3  She is breastfeeding and formula feeding.     The following portions of the patient's history were reviewed and updated as appropriate:current medications and allergies    Social History    Tobacco Use      Smoking status: Never Smoker      Smokeless tobacco: Never Used    Review of Systems   Constitutional: Negative for chills, fatigue and fever.   Respiratory: Negative for shortness of breath.    Cardiovascular: Negative for chest pain and palpitations.   Gastrointestinal: Negative for abdominal pain, constipation, diarrhea and nausea.   Genitourinary: Negative for dysuria, frequency, menstrual problem, pelvic pressure, vaginal bleeding, vaginal discharge and vaginal pain.   Skin: Negative for rash.   Neurological: Negative for headache.   Psychiatric/Behavioral: Negative for depressed mood and stress. The patient is not nervous/anxious.         Objective   /100   Ht 160 cm (63\")   Wt 88.9 kg (196 lb)   LMP 2020 (Exact Date)   BMI 34.72 kg/m²     General:  well developed; well nourished  no acute distress  facial hair on chin   Abdomen: soft, non-tender; no masses  no umbilical or inguinal hernias are present  no hepato-splenomegaly  incision is clean, dry, intact, without drainage and sticky dressing removed   Pelvis: Not performed.            Diagnoses and all orders for this visit:    Postpartum follow-up      Discussed " possible PCOS due to hirsutism and pt's history of irregular periods. Discussed importance of follow-up with her blood sugars to rule out type 2 diabetes. Consider 2 hr 75 gm OGTT at postpartum visit. Pt is undecided about birth control at this time, will further discusses this at her next visit. Counseled to continue blood pressures medication and to buy a blood pressure cuff; Pre-E precautions reviewed.     No orders of the defined types were placed in this encounter.        This note was electronically signed.    Jennifer Vivar, APRN  August 23, 2021

## 2021-09-16 ENCOUNTER — POSTPARTUM VISIT (OUTPATIENT)
Dept: OBSTETRICS AND GYNECOLOGY | Facility: CLINIC | Age: 21
End: 2021-09-16

## 2021-09-16 VITALS
SYSTOLIC BLOOD PRESSURE: 138 MMHG | WEIGHT: 194 LBS | HEIGHT: 63 IN | DIASTOLIC BLOOD PRESSURE: 64 MMHG | BODY MASS INDEX: 34.38 KG/M2

## 2021-09-16 DIAGNOSIS — Z98.891 PREVIOUS CESAREAN SECTION: ICD-10-CM

## 2021-09-16 DIAGNOSIS — Z30.09 ENCOUNTER FOR COUNSELING REGARDING CONTRACEPTION: ICD-10-CM

## 2021-09-16 PROBLEM — O13.3 GESTATIONAL HYPERTENSION, THIRD TRIMESTER: Status: RESOLVED | Noted: 2021-07-29 | Resolved: 2021-09-16

## 2021-09-16 PROBLEM — O26.893 HEARTBURN DURING PREGNANCY IN THIRD TRIMESTER: Status: RESOLVED | Noted: 2021-07-21 | Resolved: 2021-09-16

## 2021-09-16 PROBLEM — O40.3XX0 POLYHYDRAMNIOS IN THIRD TRIMESTER: Status: RESOLVED | Noted: 2021-07-29 | Resolved: 2021-09-16

## 2021-09-16 PROBLEM — O24.414 INSULIN CONTROLLED GESTATIONAL DIABETES MELLITUS (GDM) IN THIRD TRIMESTER: Status: RESOLVED | Noted: 2021-07-11 | Resolved: 2021-09-16

## 2021-09-16 PROBLEM — O99.343 DEPRESSION AFFECTING PREGNANCY IN THIRD TRIMESTER, ANTEPARTUM: Status: RESOLVED | Noted: 2021-07-11 | Resolved: 2021-09-16

## 2021-09-16 PROBLEM — O09.93 HIGH-RISK PREGNANCY IN THIRD TRIMESTER: Status: RESOLVED | Noted: 2021-07-11 | Resolved: 2021-09-16

## 2021-09-16 PROBLEM — R12 HEARTBURN DURING PREGNANCY IN THIRD TRIMESTER: Status: RESOLVED | Noted: 2021-07-21 | Resolved: 2021-09-16

## 2021-09-16 PROBLEM — F32.A DEPRESSION AFFECTING PREGNANCY IN THIRD TRIMESTER, ANTEPARTUM: Status: RESOLVED | Noted: 2021-07-11 | Resolved: 2021-09-16

## 2021-09-16 PROCEDURE — 0503F POSTPARTUM CARE VISIT: CPT | Performed by: NURSE PRACTITIONER

## 2021-09-16 RX ORDER — LEVONORGESTREL AND ETHINYL ESTRADIOL 0.15-0.03
1 KIT ORAL DAILY
Qty: 28 TABLET | Refills: 12 | Status: SHIPPED | OUTPATIENT
Start: 2021-09-16 | End: 2022-09-21 | Stop reason: HOSPADM

## 2021-09-16 NOTE — PROGRESS NOTES
"Subjective   Chief Complaint   Patient presents with   • Postpartum Care     Robyn Camara is a 21 y.o. year old  presenting to be seen for her postpartum visit.  She had a Primary .   Prenatal course was been complicated by preeclampsia and GDM.Patient reports she has stopped bleeding. Patient reports she does not have headaches, blurry vision, RUQ pain, or heartburn. Patient reports she is hesitant to start birth control because she wants to be able to get pregnant in 7 months- 1 year from now. Patient reports she would \"cry\" if she got pregnant one month from now.     Since delivery she has been sexually active.  She does not have concerns about post-partum blues/depression.   She is bottle feeding.    Lower uterine transverse  scar healing well, no redness, heat, oozing. Closed completely.   Uterus unable to be palpated      The following portions of the patient's history were reviewed and updated as appropriate:problem list, current medications and allergies    Social History    Tobacco Use      Smoking status: Never Smoker      Smokeless tobacco: Never Used    Review of Systems   Constitutional: Negative for chills, fatigue and fever.   Respiratory: Negative for shortness of breath.    Cardiovascular: Negative for chest pain and palpitations.   Gastrointestinal: Negative for abdominal pain, constipation, diarrhea and nausea.   Genitourinary: Negative for dysuria, frequency, menstrual problem, pelvic pressure, vaginal bleeding, vaginal discharge and vaginal pain.   Skin: Negative for rash.   Neurological: Negative for headache.   Psychiatric/Behavioral: Negative for depressed mood.        Objective   /64   Ht 160 cm (63\")   Wt 88 kg (194 lb)   LMP 2020 (Exact Date)   Breastfeeding No   BMI 34.37 kg/m²     General:  well developed; well nourished  no acute distress   Abdomen: soft, non-tender; no masses  no umbilical or inguinal hernias are present  no " hepato-splenomegaly   Pelvis: Clinical staff was present for exam          Diagnoses and all orders for this visit:    Encounter for postpartum visit    Encounter for counseling regarding contraception    Previous  section    Gestational diabetes mellitus (GDM), postpartum  -     Gestational, Glucose Tolerance, 2 Hour; Future    Other orders  -     levonorgestrel-ethinyl estradiol (NORDETTE) 0.15-30 MG-MCG per tablet; Take 1 tablet by mouth Daily.        New Medications Ordered This Visit   Medications   • levonorgestrel-ethinyl estradiol (NORDETTE) 0.15-30 MG-MCG per tablet     Sig: Take 1 tablet by mouth Daily.     Dispense:  28 tablet     Refill:  12     Contraceptive counseling provided, education on complete healing post-birth/ , and spacing of children provided. Patient verbalized understanding.     Patient will schedule 2-hour GTT to rule out type 2 diabetes. Counseled to establish care for primary care provider. RTC in 3 months for birth control follow up and blood pressure check.     This note was electronically signed.    Jennifer Vivar, TATYANA  2021

## 2021-12-16 ENCOUNTER — LAB (OUTPATIENT)
Dept: LAB | Facility: HOSPITAL | Age: 21
End: 2021-12-16

## 2021-12-16 ENCOUNTER — OFFICE VISIT (OUTPATIENT)
Dept: OBSTETRICS AND GYNECOLOGY | Facility: CLINIC | Age: 21
End: 2021-12-16

## 2021-12-16 VITALS
SYSTOLIC BLOOD PRESSURE: 122 MMHG | DIASTOLIC BLOOD PRESSURE: 80 MMHG | HEIGHT: 63 IN | BODY MASS INDEX: 34.2 KG/M2 | WEIGHT: 193 LBS

## 2021-12-16 DIAGNOSIS — Z01.419 WELL WOMAN EXAM WITH ROUTINE GYNECOLOGICAL EXAM: Primary | ICD-10-CM

## 2021-12-16 DIAGNOSIS — Z12.4 ENCOUNTER FOR PAPANICOLAOU SMEAR FOR CERVICAL CANCER SCREENING: ICD-10-CM

## 2021-12-16 PROCEDURE — 99395 PREV VISIT EST AGE 18-39: CPT | Performed by: NURSE PRACTITIONER

## 2021-12-16 NOTE — PROGRESS NOTES
Subjective   Robyn Camara is a 21 y.o. here for gyn annual, 3 month follow-up    Robyn Camara is a 21 year old , appt was suppose to be for 3 month birth control and blood pressure follow-up. Had severe Pre-E with recent pregnancy. B/P today is 122/80. Pt did not ever start Nordette as she had difficulty filling the rx on at [place]. She also expresses desiring to conceive again when her daughter turns 1. Declines birth control today. Has not had a pap smear and would like to proceed with today. Had her first period since giving birth on . She has seen her PCP and had labs done; was told her liver enzymes are elevated and they will recheck it in a few months.   Gynecologic Exam  The patient's pertinent negatives include no genital itching, genital lesions, genital odor, genital rash, missed menses, pelvic pain, vaginal bleeding or vaginal discharge. Pertinent negatives include no abdominal pain, chills, constipation, diarrhea, dysuria, fever, frequency, nausea, rash or vomiting. Nothing aggravates the symptoms. She has tried nothing for the symptoms. She is sexually active. No, her partner does not have an STD. She uses nothing for contraception. Her past medical history is significant for a  section.       The following portions of the patient's history were reviewed and updated as appropriate: allergies, current medications, past family history, past medical history, past social history, past surgical history and problem list.    Review of Systems   Constitutional: Negative for chills, fatigue, fever, unexpected weight gain and unexpected weight loss.   Respiratory: Negative for shortness of breath.    Cardiovascular: Negative for chest pain and palpitations.   Gastrointestinal: Negative for abdominal pain, constipation, diarrhea, nausea and vomiting.   Endocrine: Negative for cold intolerance and heat intolerance.   Genitourinary: Negative for amenorrhea, breast discharge,  breast lump, breast pain, difficulty urinating, dysuria, frequency, menstrual problem, missed menses, pelvic pain, pelvic pressure, urinary incontinence, vaginal bleeding, vaginal discharge and vaginal pain.   Skin: Negative for rash.   Neurological: Negative for weakness and headache.   Psychiatric/Behavioral: Negative for sleep disturbance, depressed mood and stress.       Objective   Physical Exam  Vitals and nursing note reviewed. Exam conducted with a chaperone present.   Constitutional:       Appearance: She is well-developed.   HENT:      Head: Normocephalic and atraumatic.   Eyes:      Conjunctiva/sclera: Conjunctivae normal.   Neck:      Thyroid: No thyromegaly.   Cardiovascular:      Rate and Rhythm: Normal rate and regular rhythm.      Heart sounds: Normal heart sounds.   Pulmonary:      Effort: Pulmonary effort is normal. No respiratory distress.      Breath sounds: Normal breath sounds.   Chest:   Breasts:      Right: Normal.      Left: Normal.       Abdominal:      General: Bowel sounds are normal. There is no distension.      Palpations: Abdomen is soft.      Tenderness: There is no abdominal tenderness.   Genitourinary:     General: Normal vulva.      Labia:         Right: No rash, tenderness, lesion or injury.         Left: No rash, tenderness, lesion or injury.       Vagina: Normal.      Cervix: Normal.      Uterus: Normal.       Adnexa: Right adnexa normal and left adnexa normal.      Rectum: Normal.      Comments: Pap test collected.   Musculoskeletal:         General: Normal range of motion.      Cervical back: Normal range of motion and neck supple.   Skin:     General: Skin is warm and dry.   Neurological:      Mental Status: She is alert and oriented to person, place, and time.   Psychiatric:         Behavior: Behavior normal.           Assessment/Plan   Diagnoses and all orders for this visit:    1. Well woman exam with routine gynecological exam (Primary)    2. Encounter for Papanicolaou  smear for cervical cancer screening  -     Liquid-based Pap Smear, Screening; Future  -     Liquid-based Pap Smear, Screening          Counseled patient to start taking PNVs. Reviewed pap smear screening guidelines and breast awareness.

## 2021-12-27 LAB
LAB AP CASE REPORT: NORMAL
PATH INTERP SPEC-IMP: NORMAL

## 2022-09-21 ENCOUNTER — OFFICE VISIT (OUTPATIENT)
Dept: OBSTETRICS AND GYNECOLOGY | Facility: CLINIC | Age: 22
End: 2022-09-21

## 2022-09-21 VITALS
HEIGHT: 63 IN | WEIGHT: 199 LBS | BODY MASS INDEX: 35.26 KG/M2 | SYSTOLIC BLOOD PRESSURE: 120 MMHG | DIASTOLIC BLOOD PRESSURE: 82 MMHG

## 2022-09-21 DIAGNOSIS — N92.6 IRREGULAR PERIODS: ICD-10-CM

## 2022-09-21 DIAGNOSIS — E28.2 PCOS (POLYCYSTIC OVARIAN SYNDROME): Primary | ICD-10-CM

## 2022-09-21 DIAGNOSIS — Z31.9 PATIENT DESIRES PREGNANCY: ICD-10-CM

## 2022-09-21 DIAGNOSIS — Z32.02 PREGNANCY EXAMINATION OR TEST, NEGATIVE RESULT: ICD-10-CM

## 2022-09-21 LAB
B-HCG UR QL: NEGATIVE
EXPIRATION DATE: 0
INTERNAL NEGATIVE CONTROL: NEGATIVE
INTERNAL POSITIVE CONTROL: POSITIVE
Lab: 0

## 2022-09-21 PROCEDURE — 81025 URINE PREGNANCY TEST: CPT | Performed by: NURSE PRACTITIONER

## 2022-09-21 PROCEDURE — 99214 OFFICE O/P EST MOD 30 MIN: CPT | Performed by: NURSE PRACTITIONER

## 2022-09-21 RX ORDER — BLOOD-GLUCOSE METER
KIT MISCELLANEOUS
COMMUNITY
Start: 2022-09-13

## 2022-09-21 RX ORDER — ALBUTEROL SULFATE 90 UG/1
AEROSOL, METERED RESPIRATORY (INHALATION) AS NEEDED
COMMUNITY
Start: 2022-06-13

## 2022-09-21 RX ORDER — ESCITALOPRAM OXALATE 10 MG/1
TABLET ORAL
COMMUNITY
Start: 2022-08-02

## 2022-09-21 RX ORDER — LANCETS 28 GAUGE
EACH MISCELLANEOUS
COMMUNITY
Start: 2022-09-13

## 2022-09-21 RX ORDER — MEDROXYPROGESTERONE ACETATE 10 MG/1
10 TABLET ORAL DAILY
Qty: 10 TABLET | Refills: 0 | Status: SHIPPED | OUTPATIENT
Start: 2022-09-21 | End: 2023-02-14 | Stop reason: HOSPADM

## 2022-09-21 RX ORDER — METFORMIN HYDROCHLORIDE 500 MG/1
1000 TABLET, EXTENDED RELEASE ORAL
COMMUNITY
Start: 2022-09-09 | End: 2023-02-14 | Stop reason: HOSPADM

## 2022-09-21 NOTE — PROGRESS NOTES
Subjective   Robyn Camara is a 22 y.o. here for no periods, desires pregnancy    History of Present Illness  Robyn Camara is a 32 yr old  premenopausal female who presents today with irregular periods and desires wanting to conceive. LMP , light bleeding. Had a primary C/S in 2021 and pregnancy was complicated by GDM and severe-Pre-E. Pt recently established care with PCP and was diagnosed with type 2 diabetes. She has started metformin. Her triglycerides were also elevated. Now that her daughter, Brooklynn, is one years old, pt desires another pregnancy but is concern as she has not had a period since . Negative UPT in clinic today.       The following portions of the patient's history were reviewed and updated as appropriate: allergies, current medications, past family history, past medical history, past social history, past surgical history and problem list.    Review of Systems   Constitutional: Negative for chills, fatigue and fever.   Respiratory: Negative for shortness of breath.    Cardiovascular: Negative for chest pain and palpitations.   Gastrointestinal: Negative for abdominal pain, constipation, diarrhea and nausea.   Genitourinary: Positive for amenorrhea and menstrual problem. Negative for dysuria, frequency, pelvic pressure, vaginal bleeding, vaginal discharge and vaginal pain.   Skin: Negative for rash.   Neurological: Negative for headache.   Psychiatric/Behavioral: Negative for depressed mood.       Objective   Physical Exam  Vitals and nursing note reviewed.   Constitutional:       Appearance: She is well-developed.   HENT:      Head: Normocephalic.   Cardiovascular:      Rate and Rhythm: Normal rate and regular rhythm.   Pulmonary:      Effort: Pulmonary effort is normal.   Musculoskeletal:         General: Normal range of motion.      Cervical back: Normal range of motion.   Skin:     General: Skin is warm and dry.   Neurological:      Mental Status: She is alert and  oriented to person, place, and time.   Psychiatric:         Behavior: Behavior normal.           Assessment & Plan   Diagnoses and all orders for this visit:    1. PCOS (polycystic ovarian syndrome) (Primary)  -     US Non-ob Transvaginal; Future  -     medroxyPROGESTERone (Provera) 10 MG tablet; Take 1 tablet by mouth Daily.  Dispense: 10 tablet; Refill: 0  -     US Non-ob Transvaginal; Future    2. Irregular periods  -     Follicle Stimulating Hormone  -     Estradiol  -     Prolactin  -     Luteinizing Hormone  -     Antimullerian Hormone (AMH)  -     17-Hydroxyprogesterone  -     Sex Horm Binding Globulin  -     Testosterone, Free, Total  -     DHEA-Sulfate  -     medroxyPROGESTERone (Provera) 10 MG tablet; Take 1 tablet by mouth Daily.  Dispense: 10 tablet; Refill: 0  -     US Non-ob Transvaginal; Future    3. Pregnancy examination or test, negative result  -     POC Pregnancy, Urine    4. Patient desires pregnancy  -     US Non-ob Transvaginal; Future      Counseled patient that she has PCOS based on irregular periods and facial hair. Pt to complete fasting labwork and pelvic U/S and follow-up to discuss results. Induce withdrawal bleed with Provera today. Will likely need Letrozol for ovulation induction.

## 2022-09-22 ENCOUNTER — LAB (OUTPATIENT)
Dept: LAB | Facility: HOSPITAL | Age: 22
End: 2022-09-22

## 2022-09-22 LAB
ESTRADIOL SERPL HS-MCNC: 49.1 PG/ML
FSH SERPL-ACNC: 5.47 MIU/ML
LH SERPL-ACNC: 9.8 MIU/ML
PROLACTIN SERPL-MCNC: 8.02 NG/ML (ref 4.79–23.3)

## 2022-09-22 PROCEDURE — 82670 ASSAY OF TOTAL ESTRADIOL: CPT | Performed by: NURSE PRACTITIONER

## 2022-09-22 PROCEDURE — 83498 ASY HYDROXYPROGESTERONE 17-D: CPT | Performed by: NURSE PRACTITIONER

## 2022-09-22 PROCEDURE — 83002 ASSAY OF GONADOTROPIN (LH): CPT | Performed by: NURSE PRACTITIONER

## 2022-09-22 PROCEDURE — 82397 CHEMILUMINESCENT ASSAY: CPT | Performed by: NURSE PRACTITIONER

## 2022-09-22 PROCEDURE — 84146 ASSAY OF PROLACTIN: CPT | Performed by: NURSE PRACTITIONER

## 2022-09-22 PROCEDURE — 82627 DEHYDROEPIANDROSTERONE: CPT | Performed by: NURSE PRACTITIONER

## 2022-09-22 PROCEDURE — 84270 ASSAY OF SEX HORMONE GLOBUL: CPT | Performed by: NURSE PRACTITIONER

## 2022-09-22 PROCEDURE — 84403 ASSAY OF TOTAL TESTOSTERONE: CPT | Performed by: NURSE PRACTITIONER

## 2022-09-22 PROCEDURE — 83001 ASSAY OF GONADOTROPIN (FSH): CPT | Performed by: NURSE PRACTITIONER

## 2022-09-22 PROCEDURE — 84402 ASSAY OF FREE TESTOSTERONE: CPT | Performed by: NURSE PRACTITIONER

## 2022-09-23 LAB
DHEA-S SERPL-MCNC: 495 UG/DL (ref 110–431.7)
SHBG SERPL-SCNC: 34.2 NMOL/L (ref 24.6–122)

## 2022-09-25 LAB
MIS SERPL-MCNC: 9.71 NG/ML
TESTOST FREE SERPL-MCNC: 5.3 PG/ML (ref 0–4.2)
TESTOST SERPL-MCNC: 88 NG/DL (ref 13–71)

## 2022-09-26 LAB — 17OHP SERPL-MCNC: 81 NG/DL

## 2022-10-20 ENCOUNTER — OFFICE VISIT (OUTPATIENT)
Dept: OBSTETRICS AND GYNECOLOGY | Facility: CLINIC | Age: 22
End: 2022-10-20

## 2022-10-20 VITALS
HEIGHT: 63 IN | SYSTOLIC BLOOD PRESSURE: 122 MMHG | DIASTOLIC BLOOD PRESSURE: 82 MMHG | WEIGHT: 200.2 LBS | BODY MASS INDEX: 35.47 KG/M2

## 2022-10-20 DIAGNOSIS — E28.2 PCOS (POLYCYSTIC OVARIAN SYNDROME): ICD-10-CM

## 2022-10-20 DIAGNOSIS — N92.6 IRREGULAR PERIODS: ICD-10-CM

## 2022-10-20 DIAGNOSIS — Z31.9 INFERTILITY MANAGEMENT: Primary | ICD-10-CM

## 2022-10-20 LAB
B-HCG UR QL: NEGATIVE
EXPIRATION DATE: NORMAL
INTERNAL NEGATIVE CONTROL: NEGATIVE
INTERNAL POSITIVE CONTROL: POSITIVE
Lab: NORMAL

## 2022-10-20 PROCEDURE — 81025 URINE PREGNANCY TEST: CPT | Performed by: NURSE PRACTITIONER

## 2022-10-20 PROCEDURE — 99213 OFFICE O/P EST LOW 20 MIN: CPT | Performed by: NURSE PRACTITIONER

## 2022-10-20 RX ORDER — LETROZOLE 2.5 MG/1
TABLET, FILM COATED ORAL
Qty: 5 TABLET | Refills: 1 | Status: SHIPPED | OUTPATIENT
Start: 2022-10-20 | End: 2023-02-14 | Stop reason: HOSPADM

## 2022-10-20 RX ORDER — PRENATAL WITH FERROUS FUM AND FOLIC ACID 3080; 920; 120; 400; 22; 1.84; 3; 20; 10; 1; 12; 200; 27; 25; 2 [IU]/1; [IU]/1; MG/1; [IU]/1; MG/1; MG/1; MG/1; MG/1; MG/1; MG/1; UG/1; MG/1; MG/1; MG/1; MG/1
1 TABLET ORAL DAILY
Qty: 30 TABLET | Refills: 11 | Status: SHIPPED | OUTPATIENT
Start: 2022-10-20 | End: 2023-02-14 | Stop reason: HOSPADM

## 2022-10-20 RX ORDER — PRENATAL WITH FERROUS FUM AND FOLIC ACID 3080; 920; 120; 400; 22; 1.84; 3; 20; 10; 1; 12; 200; 27; 25; 2 [IU]/1; [IU]/1; MG/1; [IU]/1; MG/1; MG/1; MG/1; MG/1; MG/1; MG/1; UG/1; MG/1; MG/1; MG/1; MG/1
1 TABLET ORAL DAILY
Qty: 30 TABLET | Refills: 11 | Status: SHIPPED | OUTPATIENT
Start: 2022-10-20 | End: 2022-10-20

## 2022-10-20 RX ORDER — LETROZOLE 2.5 MG/1
TABLET, FILM COATED ORAL
Qty: 5 TABLET | Refills: 1 | Status: SHIPPED | OUTPATIENT
Start: 2022-10-20 | End: 2022-10-20

## 2022-10-20 NOTE — PROGRESS NOTES
Subjective   Robyn Camara is a 22 y.o. here for follow-up on PCOS and wanting to conceive    History of Present Illness  Robyn Camara is a  premenopausal female who presents today for follow-up. Has PCOS and is desiring to conceive. LMP . Provera was given to induce a withdrawl period at her visit on 2022 but pt did not complete it as she was on vacation and did not want a period. Had a pelvic US today and her ovaries appear polycystic. No structural problems with uterus. Pt is moving to [place] sometime in the Spring.       The following portions of the patient's history were reviewed and updated as appropriate: allergies, current medications, past family history, past medical history, past social history, past surgical history and problem list.    Review of Systems   Constitutional: Negative for chills, fatigue and fever.   Respiratory: Negative for shortness of breath.    Cardiovascular: Negative for chest pain and palpitations.   Gastrointestinal: Negative for abdominal pain, constipation, diarrhea and nausea.   Genitourinary: Positive for amenorrhea. Negative for dysuria, frequency, menstrual problem, pelvic pressure, vaginal bleeding, vaginal discharge and vaginal pain.   Skin: Negative for rash.   Neurological: Negative for headache.   Psychiatric/Behavioral: Negative for depressed mood.       Objective   Physical Exam  Vitals and nursing note reviewed.   Constitutional:       Appearance: Normal appearance.   Pulmonary:      Effort: Pulmonary effort is normal.   Neurological:      Mental Status: She is alert.   Psychiatric:         Mood and Affect: Mood normal.         Behavior: Behavior normal.           Assessment & Plan   Diagnoses and all orders for this visit:    1. Infertility management (Primary)    2. PCOS (polycystic ovarian syndrome)    3. Irregular periods  -     POC Pregnancy, Urine    Other orders  -     Discontinue: letrozole (FEMARA) 2.5 MG tablet; Take 1 tablet  daily on Day 3-7 of menstrual cycle.  Dispense: 5 tablet; Refill: 1  -     Discontinue: Prenatal Vit-Fe Fumarate-FA (Prenatal 27-1) 27-1 MG tablet tablet; Take 1 tablet by mouth Daily for 360 days.  Dispense: 30 tablet; Refill: 11  -     letrozole (FEMARA) 2.5 MG tablet; Take 1 tablet daily on Day 3-7 of menstrual cycle.  Dispense: 5 tablet; Refill: 1  -     Prenatal Vit-Fe Fumarate-FA (Prenatal 27-1) 27-1 MG tablet tablet; Take 1 tablet by mouth Daily for 360 days.  Dispense: 30 tablet; Refill: 11      Reviewed R/B/A of Femara for ovulation induction. Written instructions given on inducing withdrawal bleed, Femara instructions, and timed intercourse. Will try Femara 2.5mg for 2 months and adjust dosage on as needed. Negative UPT in clinic today. Return in 2 months for follow-up.

## 2022-10-21 DIAGNOSIS — E28.2 PCOS (POLYCYSTIC OVARIAN SYNDROME): ICD-10-CM

## 2022-10-21 DIAGNOSIS — N92.6 IRREGULAR PERIODS: ICD-10-CM

## 2022-10-21 DIAGNOSIS — Z31.9 PATIENT DESIRES PREGNANCY: ICD-10-CM

## 2023-02-14 ENCOUNTER — LAB (OUTPATIENT)
Dept: LAB | Facility: HOSPITAL | Age: 23
End: 2023-02-14
Payer: OTHER GOVERNMENT

## 2023-02-14 ENCOUNTER — OFFICE VISIT (OUTPATIENT)
Dept: OBSTETRICS AND GYNECOLOGY | Facility: CLINIC | Age: 23
End: 2023-02-14
Payer: OTHER GOVERNMENT

## 2023-02-14 VITALS
BODY MASS INDEX: 34.2 KG/M2 | SYSTOLIC BLOOD PRESSURE: 126 MMHG | DIASTOLIC BLOOD PRESSURE: 80 MMHG | WEIGHT: 193 LBS | HEIGHT: 63 IN

## 2023-02-14 DIAGNOSIS — N92.6 MISSED PERIOD: ICD-10-CM

## 2023-02-14 DIAGNOSIS — E28.2 PCOS (POLYCYSTIC OVARIAN SYNDROME): ICD-10-CM

## 2023-02-14 DIAGNOSIS — N92.6 MISSED PERIOD: Primary | ICD-10-CM

## 2023-02-14 LAB
HBA1C MFR BLD: 5.5 % (ref 4.8–5.6)
HCG INTACT+B SERPL-ACNC: <1 MIU/ML

## 2023-02-14 PROCEDURE — 84702 CHORIONIC GONADOTROPIN TEST: CPT

## 2023-02-14 PROCEDURE — 83036 HEMOGLOBIN GLYCOSYLATED A1C: CPT | Performed by: NURSE PRACTITIONER

## 2023-02-14 PROCEDURE — 36415 COLL VENOUS BLD VENIPUNCTURE: CPT

## 2023-02-14 PROCEDURE — 99213 OFFICE O/P EST LOW 20 MIN: CPT | Performed by: NURSE PRACTITIONER

## 2023-02-14 RX ORDER — MEDROXYPROGESTERONE ACETATE 10 MG/1
10 TABLET ORAL DAILY
Qty: 30 TABLET | Refills: 0 | Status: SHIPPED | OUTPATIENT
Start: 2023-02-14

## 2023-02-14 RX ORDER — DULAGLUTIDE 1.5 MG/.5ML
1.5 INJECTION, SOLUTION SUBCUTANEOUS
COMMUNITY
Start: 2023-01-23

## 2023-02-14 NOTE — PROGRESS NOTES
Subjective   Robyn Camara is a 22 y.o. here for missed period    History of Present Illness  Robyn Camara is a 22 yr old  premenopausal female who is accompanied by her spouse and child today. Pt with PCOS and has previously been trying to conceive. Periods have been occurring once a month, around the  of each month the last few months so she did not ever start Femara. However, LMP is 12 and she has had negative UPTs at home. She is experiencing frequent urination and nausea which were her symptoms of pregnancy with her last child. She and her family are moving to [place] in March and wanted to be seen before moving. Is not on Trulicity for her diabetes and is working on losing weight. She voices now she wants to focus on her health first before tying to conceive again.       The following portions of the patient's history were reviewed and updated as appropriate: allergies, current medications, past family history, past medical history, past social history, past surgical history and problem list.    Review of Systems   Constitutional: Negative for chills, fatigue and fever.   Gastrointestinal: Positive for nausea. Negative for abdominal pain, constipation and diarrhea.   Genitourinary: Positive for amenorrhea and frequency. Negative for dysuria, menstrual problem, vaginal bleeding, vaginal discharge and vaginal pain.       Objective   Physical Exam  Vitals and nursing note reviewed.   Constitutional:       Appearance: Normal appearance.   Pulmonary:      Effort: Pulmonary effort is normal.   Neurological:      Mental Status: She is alert.   Psychiatric:         Mood and Affect: Mood normal.         Behavior: Behavior normal.           Assessment & Plan   Diagnoses and all orders for this visit:    1. Missed period (Primary)  -     hCG, Quantitative, Pregnancy; Future    2. PCOS (polycystic ovarian syndrome)  -     Hemoglobin A1c    Other orders  -     medroxyPROGESTERone (Provera) 10 MG tablet;  Take 1 tablet by mouth Daily. Take 1 tablet for 10 days as needed for a withdrawal bleed.  Dispense: 30 tablet; Refill: 0      If negative pregnancy test today, recommend retesting in 2 weeks. If still not pregnant or no period; will do Provera for a withdrawal bleed. Discussed sometimes PCOS causes intermittent anovulation/amenorrhea even if periods are regular for awhile. Also, recent weight fluctuation may also be attributing. Pt in agreement with this plan.

## 2023-04-21 ENCOUNTER — TRANSCRIBE ORDERS (OUTPATIENT)
Dept: LAB | Facility: HOSPITAL | Age: 23
End: 2023-04-21
Payer: OTHER GOVERNMENT

## 2023-04-21 DIAGNOSIS — Z34.83 PRENATAL CARE, SUBSEQUENT PREGNANCY, THIRD TRIMESTER: Primary | ICD-10-CM

## (undated) DEVICE — WOUND RETRACTOR AND PROTECTOR: Brand: ALEXIS O WOUND PROTECTOR-RETRACTOR

## (undated) DEVICE — GLV SURG SENSICARE POLYISPRN W/ALOE PF LF 6.5 GRN STRL

## (undated) DEVICE — PATIENT RETURN ELECTRODE, SINGLE-USE, CONTACT QUALITY MONITORING, ADULT, WITH 9FT CORD, FOR PATIENTS WEIGING OVER 33LBS. (15KG): Brand: MEGADYNE

## (undated) DEVICE — STERILE POLYISOPRENE POWDER-FREE SURGICAL GLOVES WITH EMOLLIENT COATING: Brand: PROTEXIS

## (undated) DEVICE — GLV SURG SENSICARE PI LF PF 7.5 GRN STRL

## (undated) DEVICE — PK C/SECT 60

## (undated) DEVICE — TRY SPINE PENCAN 24GA X4IN

## (undated) DEVICE — SUT MNCRYL 3/0 Y936H

## (undated) DEVICE — PENCL ES MEGADINE EZ/CLEAN BUTN W/HOLSTR 10FT

## (undated) DEVICE — GLV SURG SENSICARE W/ALOE PF LF 7 STRL

## (undated) DEVICE — GARMENT,MEDLINE,DVT,INT,CALF,MED, GEN2: Brand: MEDLINE

## (undated) DEVICE — SYS CLS SKIN PREMIERPRO EXOFINFUSION 22CM

## (undated) DEVICE — SUT  GUT PLAIN 2/0 CT1 27IN 843H

## (undated) DEVICE — GLV SURG SIGNATURE ESSENTIAL PF LTX SZ7.5

## (undated) DEVICE — SUT VIC PLS 0 CTX 36IN UD VCP978H

## (undated) DEVICE — SUT GUT CHRM 1 CTX 36IN 905H